# Patient Record
Sex: FEMALE | Race: WHITE | NOT HISPANIC OR LATINO
[De-identification: names, ages, dates, MRNs, and addresses within clinical notes are randomized per-mention and may not be internally consistent; named-entity substitution may affect disease eponyms.]

---

## 2017-01-06 ENCOUNTER — RECORDS - HEALTHEAST (OUTPATIENT)
Dept: ADMINISTRATIVE | Facility: OTHER | Age: 72
End: 2017-01-06

## 2017-01-21 ENCOUNTER — COMMUNICATION - HEALTHEAST (OUTPATIENT)
Dept: FAMILY MEDICINE | Facility: CLINIC | Age: 72
End: 2017-01-21

## 2017-01-21 DIAGNOSIS — E03.9 ACQUIRED HYPOTHYROIDISM: ICD-10-CM

## 2017-01-23 ENCOUNTER — COMMUNICATION - HEALTHEAST (OUTPATIENT)
Dept: FAMILY MEDICINE | Facility: CLINIC | Age: 72
End: 2017-01-23

## 2017-01-24 ENCOUNTER — COMMUNICATION - HEALTHEAST (OUTPATIENT)
Dept: INTERNAL MEDICINE | Facility: CLINIC | Age: 72
End: 2017-01-24

## 2017-01-24 ENCOUNTER — OFFICE VISIT - HEALTHEAST (OUTPATIENT)
Dept: INTERNAL MEDICINE | Facility: CLINIC | Age: 72
End: 2017-01-24

## 2017-01-24 DIAGNOSIS — M25.562 ACUTE PAIN OF LEFT KNEE: ICD-10-CM

## 2017-01-24 DIAGNOSIS — G50.0 TRIGEMINAL NEURALGIA OF LEFT SIDE OF FACE: ICD-10-CM

## 2017-01-24 DIAGNOSIS — M75.31 CALCIFIC TENDINITIS OF RIGHT SHOULDER: ICD-10-CM

## 2017-01-24 DIAGNOSIS — R41.3 MEMORY LOSS: ICD-10-CM

## 2017-01-24 DIAGNOSIS — M25.562 LEFT KNEE PAIN: ICD-10-CM

## 2017-01-24 DIAGNOSIS — E03.4 HYPOTHYROIDISM DUE TO ACQUIRED ATROPHY OF THYROID: ICD-10-CM

## 2017-01-24 DIAGNOSIS — R10.11 RUQ ABDOMINAL PAIN: ICD-10-CM

## 2017-01-24 ASSESSMENT — MIFFLIN-ST. JEOR: SCORE: 1342.97

## 2017-01-31 ENCOUNTER — COMMUNICATION - HEALTHEAST (OUTPATIENT)
Dept: NURSING | Facility: CLINIC | Age: 72
End: 2017-01-31

## 2017-01-31 ENCOUNTER — OFFICE VISIT - HEALTHEAST (OUTPATIENT)
Dept: FAMILY MEDICINE | Facility: CLINIC | Age: 72
End: 2017-01-31

## 2017-01-31 DIAGNOSIS — R10.9 ABDOMINAL PAIN: ICD-10-CM

## 2017-01-31 DIAGNOSIS — R41.3 MEMORY LOSS: ICD-10-CM

## 2017-01-31 DIAGNOSIS — G50.0 TRIGEMINAL NEURALGIA: ICD-10-CM

## 2017-02-01 ENCOUNTER — COMMUNICATION - HEALTHEAST (OUTPATIENT)
Dept: NURSING | Facility: CLINIC | Age: 72
End: 2017-02-01

## 2017-02-20 ENCOUNTER — COMMUNICATION - HEALTHEAST (OUTPATIENT)
Dept: FAMILY MEDICINE | Facility: CLINIC | Age: 72
End: 2017-02-20

## 2017-02-21 ENCOUNTER — HOSPITAL ENCOUNTER (OUTPATIENT)
Dept: CT IMAGING | Facility: CLINIC | Age: 72
Discharge: HOME | End: 2017-02-21
Attending: INTERNAL MEDICINE

## 2017-02-21 DIAGNOSIS — R10.11 RUQ ABDOMINAL PAIN: ICD-10-CM

## 2017-02-28 ENCOUNTER — COMMUNICATION - HEALTHEAST (OUTPATIENT)
Dept: LAB | Facility: CLINIC | Age: 72
End: 2017-02-28

## 2017-03-03 ENCOUNTER — COMMUNICATION - HEALTHEAST (OUTPATIENT)
Dept: FAMILY MEDICINE | Facility: CLINIC | Age: 72
End: 2017-03-03

## 2017-03-03 DIAGNOSIS — E03.9 ACQUIRED HYPOTHYROIDISM: ICD-10-CM

## 2017-03-14 ENCOUNTER — OFFICE VISIT - HEALTHEAST (OUTPATIENT)
Dept: NURSING | Facility: CLINIC | Age: 72
End: 2017-03-14

## 2017-03-14 ENCOUNTER — OFFICE VISIT - HEALTHEAST (OUTPATIENT)
Dept: PHYSICAL THERAPY | Facility: REHABILITATION | Age: 72
End: 2017-03-14

## 2017-03-14 DIAGNOSIS — M62.81 MUSCLE WEAKNESS (GENERALIZED): ICD-10-CM

## 2017-03-14 DIAGNOSIS — M25.662 DECREASED ROM OF LEFT KNEE: ICD-10-CM

## 2017-03-14 DIAGNOSIS — E03.9 ACQUIRED HYPOTHYROIDISM: ICD-10-CM

## 2017-03-14 DIAGNOSIS — G50.0 TRIGEMINAL NEURALGIA: ICD-10-CM

## 2017-03-14 DIAGNOSIS — G89.29 CHRONIC PAIN OF LEFT KNEE: ICD-10-CM

## 2017-03-14 DIAGNOSIS — M25.562 CHRONIC PAIN OF LEFT KNEE: ICD-10-CM

## 2017-03-14 DIAGNOSIS — M25.511 ACUTE PAIN OF RIGHT SHOULDER: ICD-10-CM

## 2017-03-14 DIAGNOSIS — F33.1 MAJOR DEPRESSIVE DISORDER, RECURRENT EPISODE, MODERATE (H): ICD-10-CM

## 2017-03-20 ENCOUNTER — RECORDS - HEALTHEAST (OUTPATIENT)
Dept: ADMINISTRATIVE | Facility: OTHER | Age: 72
End: 2017-03-20

## 2017-04-05 ENCOUNTER — HOSPITAL ENCOUNTER (OUTPATIENT)
Dept: NEUROLOGY | Facility: CLINIC | Age: 72
Setting detail: THERAPIES SERIES
Discharge: STILL ADMITTED OR ADMINISTRATIVE | End: 2017-04-05
Attending: SOCIAL WORKER

## 2017-04-05 ENCOUNTER — HOSPITAL ENCOUNTER (OUTPATIENT)
Dept: NEUROLOGY | Facility: CLINIC | Age: 72
Setting detail: THERAPIES SERIES
Discharge: STILL ADMITTED OR ADMINISTRATIVE | End: 2017-04-05
Attending: INTERNAL MEDICINE

## 2017-04-05 DIAGNOSIS — R41.3 MEMORY LOSS: ICD-10-CM

## 2017-04-12 ENCOUNTER — COMMUNICATION - HEALTHEAST (OUTPATIENT)
Dept: INTERNAL MEDICINE | Facility: CLINIC | Age: 72
End: 2017-04-12

## 2017-04-24 ENCOUNTER — COMMUNICATION - HEALTHEAST (OUTPATIENT)
Dept: INTERNAL MEDICINE | Facility: CLINIC | Age: 72
End: 2017-04-24

## 2017-04-24 ENCOUNTER — RECORDS - HEALTHEAST (OUTPATIENT)
Dept: ADMINISTRATIVE | Facility: OTHER | Age: 72
End: 2017-04-24

## 2017-04-24 ENCOUNTER — OFFICE VISIT - HEALTHEAST (OUTPATIENT)
Dept: INTERNAL MEDICINE | Facility: CLINIC | Age: 72
End: 2017-04-24

## 2017-04-24 DIAGNOSIS — G50.0 TRIGEMINAL NEURALGIA: ICD-10-CM

## 2017-04-24 DIAGNOSIS — R41.3 MEMORY LOSS: ICD-10-CM

## 2017-04-24 DIAGNOSIS — E03.9 ACQUIRED HYPOTHYROIDISM: ICD-10-CM

## 2017-04-24 ASSESSMENT — MIFFLIN-ST. JEOR: SCORE: 1345.69

## 2017-05-03 ENCOUNTER — COMMUNICATION - HEALTHEAST (OUTPATIENT)
Dept: INTERNAL MEDICINE | Facility: CLINIC | Age: 72
End: 2017-05-03

## 2017-05-08 ENCOUNTER — OFFICE VISIT - HEALTHEAST (OUTPATIENT)
Dept: PHYSICAL THERAPY | Facility: REHABILITATION | Age: 72
End: 2017-05-08

## 2017-05-08 ENCOUNTER — OFFICE VISIT - HEALTHEAST (OUTPATIENT)
Dept: NURSING | Facility: CLINIC | Age: 72
End: 2017-05-08

## 2017-05-08 DIAGNOSIS — M25.511 RIGHT SHOULDER PAIN, UNSPECIFIED CHRONICITY: ICD-10-CM

## 2017-05-08 DIAGNOSIS — M25.562 CHRONIC PAIN OF LEFT KNEE: ICD-10-CM

## 2017-05-08 DIAGNOSIS — G89.29 CHRONIC PAIN OF LEFT KNEE: ICD-10-CM

## 2017-05-08 DIAGNOSIS — M25.511 ACUTE PAIN OF RIGHT SHOULDER: ICD-10-CM

## 2017-05-08 DIAGNOSIS — R41.3 MEMORY LOSS: ICD-10-CM

## 2017-05-08 DIAGNOSIS — G50.0 TRIGEMINAL NEURALGIA: ICD-10-CM

## 2017-05-08 DIAGNOSIS — M62.81 MUSCLE WEAKNESS (GENERALIZED): ICD-10-CM

## 2017-05-08 DIAGNOSIS — M25.511 RIGHT SHOULDER PAIN: ICD-10-CM

## 2017-05-08 DIAGNOSIS — E03.9 ACQUIRED HYPOTHYROIDISM: ICD-10-CM

## 2017-05-08 DIAGNOSIS — M25.662 DECREASED ROM OF LEFT KNEE: ICD-10-CM

## 2017-06-02 ENCOUNTER — OFFICE VISIT - HEALTHEAST (OUTPATIENT)
Dept: PHYSICAL THERAPY | Facility: REHABILITATION | Age: 72
End: 2017-06-02

## 2017-06-02 DIAGNOSIS — M25.662 DECREASED ROM OF LEFT KNEE: ICD-10-CM

## 2017-06-02 DIAGNOSIS — M25.511 ACUTE PAIN OF RIGHT SHOULDER: ICD-10-CM

## 2017-06-02 DIAGNOSIS — M62.81 MUSCLE WEAKNESS (GENERALIZED): ICD-10-CM

## 2017-06-02 DIAGNOSIS — G89.29 CHRONIC PAIN OF LEFT KNEE: ICD-10-CM

## 2017-06-02 DIAGNOSIS — M25.562 CHRONIC PAIN OF LEFT KNEE: ICD-10-CM

## 2017-06-05 ENCOUNTER — COMMUNICATION - HEALTHEAST (OUTPATIENT)
Dept: NURSING | Facility: CLINIC | Age: 72
End: 2017-06-05

## 2017-06-06 ENCOUNTER — OFFICE VISIT - HEALTHEAST (OUTPATIENT)
Dept: PHYSICAL THERAPY | Facility: REHABILITATION | Age: 72
End: 2017-06-06

## 2017-06-06 DIAGNOSIS — M25.511 ACUTE PAIN OF RIGHT SHOULDER: ICD-10-CM

## 2017-06-06 DIAGNOSIS — M25.662 DECREASED ROM OF LEFT KNEE: ICD-10-CM

## 2017-06-06 DIAGNOSIS — M25.562 CHRONIC PAIN OF LEFT KNEE: ICD-10-CM

## 2017-06-06 DIAGNOSIS — G89.29 CHRONIC PAIN OF LEFT KNEE: ICD-10-CM

## 2017-06-06 DIAGNOSIS — M62.81 MUSCLE WEAKNESS (GENERALIZED): ICD-10-CM

## 2017-06-09 ENCOUNTER — OFFICE VISIT - HEALTHEAST (OUTPATIENT)
Dept: PHYSICAL THERAPY | Facility: REHABILITATION | Age: 72
End: 2017-06-09

## 2017-06-09 DIAGNOSIS — M25.511 ACUTE PAIN OF RIGHT SHOULDER: ICD-10-CM

## 2017-06-09 DIAGNOSIS — G89.29 CHRONIC PAIN OF LEFT KNEE: ICD-10-CM

## 2017-06-09 DIAGNOSIS — M62.81 MUSCLE WEAKNESS (GENERALIZED): ICD-10-CM

## 2017-06-09 DIAGNOSIS — M25.662 DECREASED ROM OF LEFT KNEE: ICD-10-CM

## 2017-06-09 DIAGNOSIS — M25.562 CHRONIC PAIN OF LEFT KNEE: ICD-10-CM

## 2017-06-13 ENCOUNTER — OFFICE VISIT - HEALTHEAST (OUTPATIENT)
Dept: PHYSICAL THERAPY | Facility: REHABILITATION | Age: 72
End: 2017-06-13

## 2017-06-13 DIAGNOSIS — G89.29 CHRONIC PAIN OF LEFT KNEE: ICD-10-CM

## 2017-06-13 DIAGNOSIS — M25.511 ACUTE PAIN OF RIGHT SHOULDER: ICD-10-CM

## 2017-06-13 DIAGNOSIS — M25.662 DECREASED ROM OF LEFT KNEE: ICD-10-CM

## 2017-06-13 DIAGNOSIS — M62.81 MUSCLE WEAKNESS (GENERALIZED): ICD-10-CM

## 2017-06-13 DIAGNOSIS — M25.562 CHRONIC PAIN OF LEFT KNEE: ICD-10-CM

## 2017-07-01 ENCOUNTER — HEALTH MAINTENANCE LETTER (OUTPATIENT)
Age: 72
End: 2017-07-01

## 2017-07-07 ENCOUNTER — OFFICE VISIT - HEALTHEAST (OUTPATIENT)
Dept: PHYSICAL THERAPY | Facility: REHABILITATION | Age: 72
End: 2017-07-07

## 2017-07-07 DIAGNOSIS — M25.662 DECREASED ROM OF LEFT KNEE: ICD-10-CM

## 2017-07-07 DIAGNOSIS — M25.511 ACUTE PAIN OF RIGHT SHOULDER: ICD-10-CM

## 2017-07-07 DIAGNOSIS — G89.29 CHRONIC PAIN OF LEFT KNEE: ICD-10-CM

## 2017-07-07 DIAGNOSIS — M62.81 MUSCLE WEAKNESS (GENERALIZED): ICD-10-CM

## 2017-07-07 DIAGNOSIS — M25.562 CHRONIC PAIN OF LEFT KNEE: ICD-10-CM

## 2017-07-10 ENCOUNTER — OFFICE VISIT - HEALTHEAST (OUTPATIENT)
Dept: PHYSICAL THERAPY | Facility: REHABILITATION | Age: 72
End: 2017-07-10

## 2017-07-10 DIAGNOSIS — M25.511 ACUTE PAIN OF RIGHT SHOULDER: ICD-10-CM

## 2017-07-10 DIAGNOSIS — M25.662 DECREASED ROM OF LEFT KNEE: ICD-10-CM

## 2017-07-10 DIAGNOSIS — M62.81 MUSCLE WEAKNESS (GENERALIZED): ICD-10-CM

## 2017-07-10 DIAGNOSIS — M25.562 CHRONIC PAIN OF LEFT KNEE: ICD-10-CM

## 2017-07-10 DIAGNOSIS — G89.29 CHRONIC PAIN OF LEFT KNEE: ICD-10-CM

## 2017-07-14 ENCOUNTER — OFFICE VISIT - HEALTHEAST (OUTPATIENT)
Dept: PHYSICAL THERAPY | Facility: REHABILITATION | Age: 72
End: 2017-07-14

## 2017-07-14 ENCOUNTER — COMMUNICATION - HEALTHEAST (OUTPATIENT)
Dept: NURSING | Facility: CLINIC | Age: 72
End: 2017-07-14

## 2017-07-14 DIAGNOSIS — M25.662 DECREASED ROM OF LEFT KNEE: ICD-10-CM

## 2017-07-14 DIAGNOSIS — M25.562 CHRONIC PAIN OF LEFT KNEE: ICD-10-CM

## 2017-07-14 DIAGNOSIS — M25.511 ACUTE PAIN OF RIGHT SHOULDER: ICD-10-CM

## 2017-07-14 DIAGNOSIS — G89.29 CHRONIC PAIN OF LEFT KNEE: ICD-10-CM

## 2017-07-14 DIAGNOSIS — M62.81 MUSCLE WEAKNESS (GENERALIZED): ICD-10-CM

## 2017-07-17 ENCOUNTER — COMMUNICATION - HEALTHEAST (OUTPATIENT)
Dept: NURSING | Facility: CLINIC | Age: 72
End: 2017-07-17

## 2017-07-24 ENCOUNTER — OFFICE VISIT - HEALTHEAST (OUTPATIENT)
Dept: PHYSICAL THERAPY | Facility: REHABILITATION | Age: 72
End: 2017-07-24

## 2017-07-24 DIAGNOSIS — M62.81 MUSCLE WEAKNESS (GENERALIZED): ICD-10-CM

## 2017-07-24 DIAGNOSIS — M25.662 DECREASED ROM OF LEFT KNEE: ICD-10-CM

## 2017-07-24 DIAGNOSIS — M25.562 CHRONIC PAIN OF LEFT KNEE: ICD-10-CM

## 2017-07-24 DIAGNOSIS — M25.511 ACUTE PAIN OF RIGHT SHOULDER: ICD-10-CM

## 2017-07-24 DIAGNOSIS — G89.29 CHRONIC PAIN OF LEFT KNEE: ICD-10-CM

## 2017-07-27 ENCOUNTER — OFFICE VISIT - HEALTHEAST (OUTPATIENT)
Dept: PHYSICAL THERAPY | Facility: REHABILITATION | Age: 72
End: 2017-07-27

## 2017-07-27 DIAGNOSIS — M25.511 ACUTE PAIN OF RIGHT SHOULDER: ICD-10-CM

## 2017-07-27 DIAGNOSIS — M62.81 MUSCLE WEAKNESS (GENERALIZED): ICD-10-CM

## 2017-07-27 DIAGNOSIS — G89.29 CHRONIC PAIN OF LEFT KNEE: ICD-10-CM

## 2017-07-27 DIAGNOSIS — M25.662 DECREASED ROM OF LEFT KNEE: ICD-10-CM

## 2017-07-27 DIAGNOSIS — M25.562 CHRONIC PAIN OF LEFT KNEE: ICD-10-CM

## 2017-08-23 ENCOUNTER — OFFICE VISIT - HEALTHEAST (OUTPATIENT)
Dept: PHYSICAL THERAPY | Facility: REHABILITATION | Age: 72
End: 2017-08-23

## 2017-08-23 DIAGNOSIS — M25.662 DECREASED ROM OF LEFT KNEE: ICD-10-CM

## 2017-08-23 DIAGNOSIS — M62.81 MUSCLE WEAKNESS (GENERALIZED): ICD-10-CM

## 2017-08-23 DIAGNOSIS — M25.511 ACUTE PAIN OF RIGHT SHOULDER: ICD-10-CM

## 2017-08-23 DIAGNOSIS — M25.562 CHRONIC PAIN OF LEFT KNEE: ICD-10-CM

## 2017-08-23 DIAGNOSIS — G89.29 CHRONIC PAIN OF LEFT KNEE: ICD-10-CM

## 2017-08-28 ENCOUNTER — OFFICE VISIT - HEALTHEAST (OUTPATIENT)
Dept: PHYSICAL THERAPY | Facility: REHABILITATION | Age: 72
End: 2017-08-28

## 2017-08-28 DIAGNOSIS — M25.662 DECREASED ROM OF LEFT KNEE: ICD-10-CM

## 2017-08-28 DIAGNOSIS — M62.81 MUSCLE WEAKNESS (GENERALIZED): ICD-10-CM

## 2017-08-28 DIAGNOSIS — M25.562 CHRONIC PAIN OF LEFT KNEE: ICD-10-CM

## 2017-08-28 DIAGNOSIS — G89.29 CHRONIC PAIN OF LEFT KNEE: ICD-10-CM

## 2017-08-28 DIAGNOSIS — M25.511 ACUTE PAIN OF RIGHT SHOULDER: ICD-10-CM

## 2017-08-30 ENCOUNTER — OFFICE VISIT - HEALTHEAST (OUTPATIENT)
Dept: PHYSICAL THERAPY | Facility: REHABILITATION | Age: 72
End: 2017-08-30

## 2017-08-30 DIAGNOSIS — M25.511 ACUTE PAIN OF RIGHT SHOULDER: ICD-10-CM

## 2017-08-30 DIAGNOSIS — M62.81 MUSCLE WEAKNESS (GENERALIZED): ICD-10-CM

## 2017-08-30 DIAGNOSIS — M25.662 DECREASED ROM OF LEFT KNEE: ICD-10-CM

## 2017-08-30 DIAGNOSIS — G89.29 CHRONIC PAIN OF LEFT KNEE: ICD-10-CM

## 2017-08-30 DIAGNOSIS — M25.562 CHRONIC PAIN OF LEFT KNEE: ICD-10-CM

## 2017-09-08 ENCOUNTER — OFFICE VISIT - HEALTHEAST (OUTPATIENT)
Dept: PHYSICAL THERAPY | Facility: REHABILITATION | Age: 72
End: 2017-09-08

## 2017-09-08 DIAGNOSIS — M25.562 CHRONIC PAIN OF LEFT KNEE: ICD-10-CM

## 2017-09-08 DIAGNOSIS — G89.29 CHRONIC PAIN OF LEFT KNEE: ICD-10-CM

## 2017-09-08 DIAGNOSIS — M62.81 MUSCLE WEAKNESS (GENERALIZED): ICD-10-CM

## 2017-09-08 DIAGNOSIS — M25.662 DECREASED ROM OF LEFT KNEE: ICD-10-CM

## 2017-09-08 DIAGNOSIS — M25.511 ACUTE PAIN OF RIGHT SHOULDER: ICD-10-CM

## 2017-09-15 ENCOUNTER — COMMUNICATION - HEALTHEAST (OUTPATIENT)
Dept: INTERNAL MEDICINE | Facility: CLINIC | Age: 72
End: 2017-09-15

## 2017-10-10 ENCOUNTER — HOSPITAL ENCOUNTER (OUTPATIENT)
Dept: NEUROLOGY | Facility: CLINIC | Age: 72
Setting detail: THERAPIES SERIES
Discharge: STILL ADMITTED OR ADMINISTRATIVE | End: 2017-10-10
Attending: PSYCHIATRY & NEUROLOGY

## 2017-10-10 DIAGNOSIS — G47.33 OSA (OBSTRUCTIVE SLEEP APNEA): ICD-10-CM

## 2017-10-10 DIAGNOSIS — F33.1 MAJOR DEPRESSIVE DISORDER, RECURRENT EPISODE, MODERATE (H): ICD-10-CM

## 2017-10-10 DIAGNOSIS — F03.91 MAJOR NEUROCOGNITIVE DISORDER DUE TO ALZHEIMER'S DISEASE, PROBABLE, WITH BEHAVIORAL DISTURBANCE: ICD-10-CM

## 2017-10-10 DIAGNOSIS — I97.3 POSTOPERATIVE HYPERTENSION: ICD-10-CM

## 2017-10-10 DIAGNOSIS — K58.9 IRRITABLE BOWEL DISEASE: ICD-10-CM

## 2017-10-10 DIAGNOSIS — F33.2 SEVERE EPISODE OF RECURRENT MAJOR DEPRESSIVE DISORDER, WITHOUT PSYCHOTIC FEATURES (H): ICD-10-CM

## 2017-10-10 DIAGNOSIS — G50.0 TRIGEMINAL NEURALGIA: ICD-10-CM

## 2017-10-10 DIAGNOSIS — H18.519 ENDOTHELIAL CORNEAL DYSTROPHY: ICD-10-CM

## 2017-10-13 ENCOUNTER — COMMUNICATION - HEALTHEAST (OUTPATIENT)
Dept: NURSING | Facility: CLINIC | Age: 72
End: 2017-10-13

## 2017-10-17 ENCOUNTER — COMMUNICATION - HEALTHEAST (OUTPATIENT)
Dept: NURSING | Facility: CLINIC | Age: 72
End: 2017-10-17

## 2017-10-25 ENCOUNTER — HOSPITAL ENCOUNTER (OUTPATIENT)
Dept: NEUROLOGY | Facility: CLINIC | Age: 72
Setting detail: THERAPIES SERIES
Discharge: STILL ADMITTED OR ADMINISTRATIVE | End: 2017-10-25
Attending: PSYCHIATRY & NEUROLOGY

## 2017-10-25 ENCOUNTER — AMBULATORY - HEALTHEAST (OUTPATIENT)
Dept: NEUROLOGY | Facility: CLINIC | Age: 72
End: 2017-10-25

## 2017-10-25 DIAGNOSIS — G30.9 ALZHEIMER DISEASE (H): ICD-10-CM

## 2017-10-25 DIAGNOSIS — R41.0 CONFUSION: ICD-10-CM

## 2017-10-25 DIAGNOSIS — F02.80 ALZHEIMER DISEASE (H): ICD-10-CM

## 2017-11-03 ENCOUNTER — COMMUNICATION - HEALTHEAST (OUTPATIENT)
Dept: INTERNAL MEDICINE | Facility: CLINIC | Age: 72
End: 2017-11-03

## 2017-11-03 ENCOUNTER — COMMUNICATION - HEALTHEAST (OUTPATIENT)
Dept: NURSING | Facility: CLINIC | Age: 72
End: 2017-11-03

## 2017-11-06 ENCOUNTER — COMMUNICATION - HEALTHEAST (OUTPATIENT)
Dept: NURSING | Facility: CLINIC | Age: 72
End: 2017-11-06

## 2017-11-07 ENCOUNTER — COMMUNICATION - HEALTHEAST (OUTPATIENT)
Dept: CARE COORDINATION | Facility: CLINIC | Age: 72
End: 2017-11-07

## 2017-11-07 ENCOUNTER — COMMUNICATION - HEALTHEAST (OUTPATIENT)
Dept: NURSING | Facility: CLINIC | Age: 72
End: 2017-11-07

## 2017-11-08 ENCOUNTER — AMBULATORY - HEALTHEAST (OUTPATIENT)
Dept: CARE COORDINATION | Facility: CLINIC | Age: 72
End: 2017-11-08

## 2017-11-08 ENCOUNTER — COMMUNICATION - HEALTHEAST (OUTPATIENT)
Dept: INTERNAL MEDICINE | Facility: CLINIC | Age: 72
End: 2017-11-08

## 2017-11-08 ENCOUNTER — OFFICE VISIT - HEALTHEAST (OUTPATIENT)
Dept: INTERNAL MEDICINE | Facility: CLINIC | Age: 72
End: 2017-11-08

## 2017-11-08 DIAGNOSIS — F33.1 MAJOR DEPRESSIVE DISORDER, RECURRENT EPISODE, MODERATE (H): ICD-10-CM

## 2017-11-08 DIAGNOSIS — M15.0 PRIMARY OSTEOARTHRITIS INVOLVING MULTIPLE JOINTS: ICD-10-CM

## 2017-11-08 DIAGNOSIS — F02.80 ALZHEIMER'S DEMENTIA (H): ICD-10-CM

## 2017-11-08 DIAGNOSIS — Z12.11 SCREEN FOR COLON CANCER: ICD-10-CM

## 2017-11-08 DIAGNOSIS — G30.9 ALZHEIMER'S DEMENTIA (H): ICD-10-CM

## 2017-11-08 DIAGNOSIS — M75.31 CALCIFIC TENDINITIS OF RIGHT SHOULDER: ICD-10-CM

## 2017-11-08 DIAGNOSIS — M54.42 CHRONIC MIDLINE LOW BACK PAIN WITH LEFT-SIDED SCIATICA: ICD-10-CM

## 2017-11-08 DIAGNOSIS — Z12.31 VISIT FOR SCREENING MAMMOGRAM: ICD-10-CM

## 2017-11-08 DIAGNOSIS — G50.0 TRIGEMINAL NEURALGIA: ICD-10-CM

## 2017-11-08 DIAGNOSIS — G89.29 CHRONIC MIDLINE LOW BACK PAIN WITH LEFT-SIDED SCIATICA: ICD-10-CM

## 2017-11-13 ENCOUNTER — AMBULATORY - HEALTHEAST (OUTPATIENT)
Dept: CARE COORDINATION | Facility: CLINIC | Age: 72
End: 2017-11-13

## 2017-11-13 DIAGNOSIS — H18.519 ENDOTHELIAL CORNEAL DYSTROPHY: ICD-10-CM

## 2017-11-17 ENCOUNTER — COMMUNICATION - HEALTHEAST (OUTPATIENT)
Dept: NURSING | Facility: CLINIC | Age: 72
End: 2017-11-17

## 2017-11-30 ENCOUNTER — COMMUNICATION - HEALTHEAST (OUTPATIENT)
Dept: NURSING | Facility: CLINIC | Age: 72
End: 2017-11-30

## 2017-12-01 ENCOUNTER — COMMUNICATION - HEALTHEAST (OUTPATIENT)
Dept: CARE COORDINATION | Facility: CLINIC | Age: 72
End: 2017-12-01

## 2017-12-01 ENCOUNTER — AMBULATORY - HEALTHEAST (OUTPATIENT)
Dept: CARE COORDINATION | Facility: CLINIC | Age: 72
End: 2017-12-01

## 2017-12-06 ENCOUNTER — AMBULATORY - HEALTHEAST (OUTPATIENT)
Dept: CARE COORDINATION | Facility: CLINIC | Age: 72
End: 2017-12-06

## 2017-12-15 ENCOUNTER — HOME CARE/HOSPICE - HEALTHEAST (OUTPATIENT)
Dept: HOME HEALTH SERVICES | Facility: HOME HEALTH | Age: 72
End: 2017-12-15

## 2017-12-15 ENCOUNTER — COMMUNICATION - HEALTHEAST (OUTPATIENT)
Dept: HOME HEALTH SERVICES | Facility: HOME HEALTH | Age: 72
End: 2017-12-15

## 2017-12-28 ENCOUNTER — HOSPITAL ENCOUNTER (OUTPATIENT)
Dept: NEUROLOGY | Facility: CLINIC | Age: 72
Setting detail: THERAPIES SERIES
Discharge: STILL ADMITTED OR ADMINISTRATIVE | End: 2017-12-28
Attending: PSYCHIATRY & NEUROLOGY

## 2017-12-28 ENCOUNTER — HOSPITAL ENCOUNTER (OUTPATIENT)
Dept: OCCUPATIONAL THERAPY | Age: 72
Setting detail: THERAPIES SERIES
Discharge: STILL ADMITTED OR ADMINISTRATIVE | End: 2017-12-28
Attending: PSYCHIATRY & NEUROLOGY

## 2017-12-28 DIAGNOSIS — R41.89 COGNITIVE IMPAIRMENT: ICD-10-CM

## 2017-12-28 DIAGNOSIS — R41.3 OTHER AMNESIA: ICD-10-CM

## 2018-01-02 ENCOUNTER — COMMUNICATION - HEALTHEAST (OUTPATIENT)
Dept: NURSING | Facility: CLINIC | Age: 73
End: 2018-01-02

## 2018-01-05 ENCOUNTER — COMMUNICATION - HEALTHEAST (OUTPATIENT)
Dept: CARE COORDINATION | Facility: CLINIC | Age: 73
End: 2018-01-05

## 2018-01-05 ENCOUNTER — COMMUNICATION - HEALTHEAST (OUTPATIENT)
Dept: NURSING | Facility: CLINIC | Age: 73
End: 2018-01-05

## 2018-01-08 ENCOUNTER — COMMUNICATION - HEALTHEAST (OUTPATIENT)
Dept: CARE COORDINATION | Facility: CLINIC | Age: 73
End: 2018-01-08

## 2018-01-08 ENCOUNTER — AMBULATORY - HEALTHEAST (OUTPATIENT)
Dept: NEUROLOGY | Facility: CLINIC | Age: 73
End: 2018-01-08

## 2018-01-08 ENCOUNTER — COMMUNICATION - HEALTHEAST (OUTPATIENT)
Dept: NURSING | Facility: CLINIC | Age: 73
End: 2018-01-08

## 2018-01-15 ENCOUNTER — COMMUNICATION - HEALTHEAST (OUTPATIENT)
Dept: NURSING | Facility: CLINIC | Age: 73
End: 2018-01-15

## 2018-01-22 ENCOUNTER — COMMUNICATION - HEALTHEAST (OUTPATIENT)
Dept: NURSING | Facility: CLINIC | Age: 73
End: 2018-01-22

## 2018-01-27 ENCOUNTER — COMMUNICATION - HEALTHEAST (OUTPATIENT)
Dept: FAMILY MEDICINE | Facility: CLINIC | Age: 73
End: 2018-01-27

## 2018-01-27 DIAGNOSIS — G50.0 TRIGEMINAL NEURALGIA: ICD-10-CM

## 2018-01-30 ENCOUNTER — COMMUNICATION - HEALTHEAST (OUTPATIENT)
Dept: NURSING | Facility: CLINIC | Age: 73
End: 2018-01-30

## 2018-02-12 ENCOUNTER — AMBULATORY - HEALTHEAST (OUTPATIENT)
Dept: CARE COORDINATION | Facility: CLINIC | Age: 73
End: 2018-02-12

## 2018-02-12 ENCOUNTER — AMBULATORY - HEALTHEAST (OUTPATIENT)
Dept: NEUROLOGY | Facility: CLINIC | Age: 73
End: 2018-02-12

## 2018-02-13 ENCOUNTER — COMMUNICATION - HEALTHEAST (OUTPATIENT)
Dept: NURSING | Facility: CLINIC | Age: 73
End: 2018-02-13

## 2018-02-14 ENCOUNTER — COMMUNICATION - HEALTHEAST (OUTPATIENT)
Dept: CARE COORDINATION | Facility: CLINIC | Age: 73
End: 2018-02-14

## 2018-02-21 ENCOUNTER — AMBULATORY - HEALTHEAST (OUTPATIENT)
Dept: CARE COORDINATION | Facility: CLINIC | Age: 73
End: 2018-02-21

## 2018-02-21 DIAGNOSIS — Z71.89 COUNSELING AND COORDINATION OF CARE: ICD-10-CM

## 2018-02-27 ENCOUNTER — COMMUNICATION - HEALTHEAST (OUTPATIENT)
Dept: NURSING | Facility: CLINIC | Age: 73
End: 2018-02-27

## 2018-03-01 ENCOUNTER — AMBULATORY - HEALTHEAST (OUTPATIENT)
Dept: CARE COORDINATION | Facility: CLINIC | Age: 73
End: 2018-03-01

## 2018-03-06 ENCOUNTER — COMMUNICATION - HEALTHEAST (OUTPATIENT)
Dept: NURSING | Facility: CLINIC | Age: 73
End: 2018-03-06

## 2018-03-06 ENCOUNTER — AMBULATORY - HEALTHEAST (OUTPATIENT)
Dept: NEUROLOGY | Facility: CLINIC | Age: 73
End: 2018-03-06

## 2018-03-08 ENCOUNTER — COMMUNICATION - HEALTHEAST (OUTPATIENT)
Dept: NURSING | Facility: CLINIC | Age: 73
End: 2018-03-08

## 2018-03-08 ENCOUNTER — COMMUNICATION - HEALTHEAST (OUTPATIENT)
Dept: INTERNAL MEDICINE | Facility: CLINIC | Age: 73
End: 2018-03-08

## 2018-03-21 ENCOUNTER — COMMUNICATION - HEALTHEAST (OUTPATIENT)
Dept: NURSING | Facility: CLINIC | Age: 73
End: 2018-03-21

## 2018-03-23 ENCOUNTER — COMMUNICATION - HEALTHEAST (OUTPATIENT)
Dept: NURSING | Facility: CLINIC | Age: 73
End: 2018-03-23

## 2018-03-23 ENCOUNTER — OFFICE VISIT - HEALTHEAST (OUTPATIENT)
Dept: INTERNAL MEDICINE | Facility: CLINIC | Age: 73
End: 2018-03-23

## 2018-03-23 DIAGNOSIS — Z78.0 POSTMENOPAUSAL: ICD-10-CM

## 2018-03-23 DIAGNOSIS — M25.562 CHRONIC PAIN OF LEFT KNEE: ICD-10-CM

## 2018-03-23 DIAGNOSIS — C73 THYROID CANCER (H): ICD-10-CM

## 2018-03-23 DIAGNOSIS — E03.9 HYPOTHYROIDISM, UNSPECIFIED TYPE: ICD-10-CM

## 2018-03-23 DIAGNOSIS — Z12.31 VISIT FOR SCREENING MAMMOGRAM: ICD-10-CM

## 2018-03-23 DIAGNOSIS — G30.0 EARLY ONSET ALZHEIMER'S DEMENTIA WITHOUT BEHAVIORAL DISTURBANCE (H): ICD-10-CM

## 2018-03-23 DIAGNOSIS — G89.29 CHRONIC PAIN OF LEFT KNEE: ICD-10-CM

## 2018-03-23 DIAGNOSIS — F02.80 EARLY ONSET ALZHEIMER'S DEMENTIA WITHOUT BEHAVIORAL DISTURBANCE (H): ICD-10-CM

## 2018-03-23 DIAGNOSIS — M25.511 CHRONIC RIGHT SHOULDER PAIN: ICD-10-CM

## 2018-03-23 DIAGNOSIS — G89.29 CHRONIC RIGHT SHOULDER PAIN: ICD-10-CM

## 2018-03-23 DIAGNOSIS — E55.9 VITAMIN D DEFICIENCY: ICD-10-CM

## 2018-03-23 LAB — TSH SERPL DL<=0.005 MIU/L-ACNC: 3.36 UIU/ML (ref 0.3–5)

## 2018-03-23 ASSESSMENT — MIFFLIN-ST. JEOR: SCORE: 1290.81

## 2018-03-26 LAB
25(OH)D3 SERPL-MCNC: 44 NG/ML (ref 30–80)
25(OH)D3 SERPL-MCNC: 44 NG/ML (ref 30–80)
THYROGLOBULIN ANTIBODY, S: <1.8 IU/ML
THYROGLOBULIN INTERPRETATION - HISTORICAL: ABNORMAL
THYROGLOBULIN, TUMOR MARKER: 0.2 NG/ML

## 2018-03-27 ENCOUNTER — COMMUNICATION - HEALTHEAST (OUTPATIENT)
Dept: INTERNAL MEDICINE | Facility: CLINIC | Age: 73
End: 2018-03-27

## 2018-03-27 DIAGNOSIS — C73 THYROID CANCER (H): ICD-10-CM

## 2018-03-27 DIAGNOSIS — R76.8 THYROGLOBULIN ANTIBODY POSITIVE: ICD-10-CM

## 2018-03-27 DIAGNOSIS — E89.0 H/O THYROIDECTOMY: ICD-10-CM

## 2018-03-28 ENCOUNTER — HOSPITAL ENCOUNTER (OUTPATIENT)
Dept: NEUROLOGY | Facility: CLINIC | Age: 73
Setting detail: THERAPIES SERIES
Discharge: STILL ADMITTED OR ADMINISTRATIVE | End: 2018-03-28
Attending: PSYCHIATRY & NEUROLOGY

## 2018-03-28 DIAGNOSIS — F02.80 ALZHEIMER'S DEMENTIA WITHOUT BEHAVIORAL DISTURBANCE, UNSPECIFIED TIMING OF DEMENTIA ONSET: ICD-10-CM

## 2018-03-28 DIAGNOSIS — G30.9 ALZHEIMER'S DEMENTIA WITHOUT BEHAVIORAL DISTURBANCE, UNSPECIFIED TIMING OF DEMENTIA ONSET: ICD-10-CM

## 2018-04-05 ENCOUNTER — AMBULATORY - HEALTHEAST (OUTPATIENT)
Dept: NEUROLOGY | Facility: CLINIC | Age: 73
End: 2018-04-05

## 2018-04-10 ENCOUNTER — HOSPITAL ENCOUNTER (OUTPATIENT)
Dept: ULTRASOUND IMAGING | Facility: CLINIC | Age: 73
Discharge: HOME | End: 2018-04-10
Attending: INTERNAL MEDICINE

## 2018-04-10 DIAGNOSIS — E89.0 H/O THYROIDECTOMY: ICD-10-CM

## 2018-04-10 DIAGNOSIS — R76.8 THYROGLOBULIN ANTIBODY POSITIVE: ICD-10-CM

## 2018-04-10 DIAGNOSIS — C73 THYROID CANCER (H): ICD-10-CM

## 2018-04-10 DIAGNOSIS — R94.6 ABNORMAL RESULTS OF THYROID FUNCTION STUDIES: ICD-10-CM

## 2018-04-10 DIAGNOSIS — E89.0 POSTPROCEDURAL HYPOTHYROIDISM: ICD-10-CM

## 2018-04-12 ENCOUNTER — RECORDS - HEALTHEAST (OUTPATIENT)
Dept: ADMINISTRATIVE | Facility: OTHER | Age: 73
End: 2018-04-12

## 2018-04-13 ENCOUNTER — COMMUNICATION - HEALTHEAST (OUTPATIENT)
Dept: NURSING | Facility: CLINIC | Age: 73
End: 2018-04-13

## 2018-04-17 ENCOUNTER — AMBULATORY - HEALTHEAST (OUTPATIENT)
Dept: NURSING | Facility: CLINIC | Age: 73
End: 2018-04-17

## 2018-05-04 ENCOUNTER — COMMUNICATION - HEALTHEAST (OUTPATIENT)
Dept: NURSING | Facility: CLINIC | Age: 73
End: 2018-05-04

## 2018-05-07 ENCOUNTER — COMMUNICATION - HEALTHEAST (OUTPATIENT)
Dept: CARE COORDINATION | Facility: CLINIC | Age: 73
End: 2018-05-07

## 2018-05-09 ENCOUNTER — AMBULATORY - HEALTHEAST (OUTPATIENT)
Dept: NURSING | Facility: CLINIC | Age: 73
End: 2018-05-09

## 2018-05-09 ENCOUNTER — RECORDS - HEALTHEAST (OUTPATIENT)
Dept: ADMINISTRATIVE | Facility: OTHER | Age: 73
End: 2018-05-09

## 2018-05-09 ENCOUNTER — AMBULATORY - HEALTHEAST (OUTPATIENT)
Dept: NEUROLOGY | Facility: CLINIC | Age: 73
End: 2018-05-09

## 2018-05-10 ENCOUNTER — AMBULATORY - HEALTHEAST (OUTPATIENT)
Dept: NEUROLOGY | Facility: CLINIC | Age: 73
End: 2018-05-10

## 2018-05-10 ENCOUNTER — AMBULATORY - HEALTHEAST (OUTPATIENT)
Dept: CARE COORDINATION | Facility: CLINIC | Age: 73
End: 2018-05-10

## 2018-05-10 DIAGNOSIS — F02.80 ALZHEIMER'S DISEASE (H): ICD-10-CM

## 2018-05-10 DIAGNOSIS — F02.80 DEMENTIA IN ALZHEIMER'S DISEASE (H): ICD-10-CM

## 2018-05-10 DIAGNOSIS — G30.9 DEMENTIA IN ALZHEIMER'S DISEASE (H): ICD-10-CM

## 2018-05-10 DIAGNOSIS — G30.9 ALZHEIMER'S DISEASE (H): ICD-10-CM

## 2018-06-01 ENCOUNTER — COMMUNICATION - HEALTHEAST (OUTPATIENT)
Dept: NURSING | Facility: CLINIC | Age: 73
End: 2018-06-01

## 2018-06-01 ENCOUNTER — COMMUNICATION - HEALTHEAST (OUTPATIENT)
Dept: INTERNAL MEDICINE | Facility: CLINIC | Age: 73
End: 2018-06-01

## 2018-06-11 ENCOUNTER — HOSPITAL ENCOUNTER (OUTPATIENT)
Dept: OCCUPATIONAL THERAPY | Age: 73
Setting detail: THERAPIES SERIES
Discharge: STILL ADMITTED OR ADMINISTRATIVE | End: 2018-06-11
Attending: PSYCHIATRY & NEUROLOGY

## 2018-06-11 ENCOUNTER — HOSPITAL ENCOUNTER (OUTPATIENT)
Dept: NEUROLOGY | Facility: CLINIC | Age: 73
Setting detail: THERAPIES SERIES
Discharge: STILL ADMITTED OR ADMINISTRATIVE | End: 2018-06-11
Attending: PSYCHIATRY & NEUROLOGY

## 2018-06-11 DIAGNOSIS — G30.9 DEMENTIA IN ALZHEIMER'S DISEASE (H): ICD-10-CM

## 2018-06-11 DIAGNOSIS — R41.89 COGNITIVE IMPAIRMENT: ICD-10-CM

## 2018-06-11 DIAGNOSIS — F02.80 ALZHEIMER'S DISEASE (H): ICD-10-CM

## 2018-06-11 DIAGNOSIS — F02.80 DEMENTIA IN ALZHEIMER'S DISEASE (H): ICD-10-CM

## 2018-06-11 DIAGNOSIS — G30.9 ALZHEIMER'S DISEASE (H): ICD-10-CM

## 2018-06-12 ENCOUNTER — AMBULATORY - HEALTHEAST (OUTPATIENT)
Dept: NEUROLOGY | Facility: CLINIC | Age: 73
End: 2018-06-12

## 2018-06-13 ENCOUNTER — AMBULATORY - HEALTHEAST (OUTPATIENT)
Dept: NEUROLOGY | Facility: CLINIC | Age: 73
End: 2018-06-13

## 2018-06-15 ENCOUNTER — COMMUNICATION - HEALTHEAST (OUTPATIENT)
Dept: NURSING | Facility: CLINIC | Age: 73
End: 2018-06-15

## 2018-06-20 ENCOUNTER — COMMUNICATION - HEALTHEAST (OUTPATIENT)
Dept: NEUROLOGY | Facility: CLINIC | Age: 73
End: 2018-06-20

## 2018-06-28 ENCOUNTER — AMBULATORY - HEALTHEAST (OUTPATIENT)
Dept: NURSING | Facility: CLINIC | Age: 73
End: 2018-06-28

## 2018-06-28 ENCOUNTER — AMBULATORY - HEALTHEAST (OUTPATIENT)
Dept: NEUROLOGY | Facility: CLINIC | Age: 73
End: 2018-06-28

## 2018-06-28 ENCOUNTER — COMMUNICATION - HEALTHEAST (OUTPATIENT)
Dept: NURSING | Facility: CLINIC | Age: 73
End: 2018-06-28

## 2018-07-07 ENCOUNTER — HEALTH MAINTENANCE LETTER (OUTPATIENT)
Age: 73
End: 2018-07-07

## 2018-07-12 ENCOUNTER — COMMUNICATION - HEALTHEAST (OUTPATIENT)
Dept: NURSING | Facility: CLINIC | Age: 73
End: 2018-07-12

## 2018-07-16 ENCOUNTER — COMMUNICATION - HEALTHEAST (OUTPATIENT)
Dept: INTERNAL MEDICINE | Facility: CLINIC | Age: 73
End: 2018-07-16

## 2018-07-16 DIAGNOSIS — Z12.11 COLON CANCER SCREENING: ICD-10-CM

## 2018-07-19 ENCOUNTER — COMMUNICATION - HEALTHEAST (OUTPATIENT)
Dept: NURSING | Facility: CLINIC | Age: 73
End: 2018-07-19

## 2018-08-05 ENCOUNTER — COMMUNICATION - HEALTHEAST (OUTPATIENT)
Dept: INTERNAL MEDICINE | Facility: CLINIC | Age: 73
End: 2018-08-05

## 2018-08-05 DIAGNOSIS — E03.9 ACQUIRED HYPOTHYROIDISM: ICD-10-CM

## 2018-08-10 ENCOUNTER — COMMUNICATION - HEALTHEAST (OUTPATIENT)
Dept: INTERNAL MEDICINE | Facility: CLINIC | Age: 73
End: 2018-08-10

## 2018-08-28 ENCOUNTER — AMBULATORY - HEALTHEAST (OUTPATIENT)
Dept: NEUROLOGY | Facility: CLINIC | Age: 73
End: 2018-08-28

## 2018-11-14 ENCOUNTER — COMMUNICATION - HEALTHEAST (OUTPATIENT)
Dept: INTERNAL MEDICINE | Facility: CLINIC | Age: 73
End: 2018-11-14

## 2018-11-15 ENCOUNTER — OFFICE VISIT - HEALTHEAST (OUTPATIENT)
Dept: INTERNAL MEDICINE | Facility: CLINIC | Age: 73
End: 2018-11-15

## 2018-11-15 DIAGNOSIS — C73 THYROID CANCER (H): ICD-10-CM

## 2018-11-15 DIAGNOSIS — E03.9 HYPOTHYROIDISM, UNSPECIFIED TYPE: ICD-10-CM

## 2018-11-15 DIAGNOSIS — F02.80 ALZHEIMER'S DEMENTIA WITHOUT BEHAVIORAL DISTURBANCE, UNSPECIFIED TIMING OF DEMENTIA ONSET: ICD-10-CM

## 2018-11-15 DIAGNOSIS — E55.9 VITAMIN D DEFICIENCY: ICD-10-CM

## 2018-11-15 DIAGNOSIS — F32.1 CURRENT MODERATE EPISODE OF MAJOR DEPRESSIVE DISORDER, UNSPECIFIED WHETHER RECURRENT (H): ICD-10-CM

## 2018-11-15 DIAGNOSIS — G30.9 ALZHEIMER'S DEMENTIA WITHOUT BEHAVIORAL DISTURBANCE, UNSPECIFIED TIMING OF DEMENTIA ONSET: ICD-10-CM

## 2018-11-15 LAB
ERYTHROCYTE [DISTWIDTH] IN BLOOD BY AUTOMATED COUNT: 11.1 % (ref 11–14.5)
HCT VFR BLD AUTO: 39.7 % (ref 35–47)
HGB BLD-MCNC: 13.5 G/DL (ref 12–16)
MCH RBC QN AUTO: 32.6 PG (ref 27–34)
MCHC RBC AUTO-ENTMCNC: 34.1 G/DL (ref 32–36)
MCV RBC AUTO: 96 FL (ref 80–100)
PLATELET # BLD AUTO: 257 THOU/UL (ref 140–440)
PMV BLD AUTO: 7 FL (ref 7–10)
RBC # BLD AUTO: 4.15 MILL/UL (ref 3.8–5.4)
WBC: 6.7 THOU/UL (ref 4–11)

## 2018-11-15 ASSESSMENT — MIFFLIN-ST. JEOR: SCORE: 1263.59

## 2018-11-16 LAB
ALBUMIN SERPL-MCNC: 4.1 G/DL (ref 3.5–5)
ALP SERPL-CCNC: 82 U/L (ref 45–120)
ALT SERPL W P-5'-P-CCNC: <9 U/L (ref 0–45)
ANION GAP SERPL CALCULATED.3IONS-SCNC: 11 MMOL/L (ref 5–18)
AST SERPL W P-5'-P-CCNC: 16 U/L (ref 0–40)
BILIRUB SERPL-MCNC: 0.3 MG/DL (ref 0–1)
BUN SERPL-MCNC: 19 MG/DL (ref 8–28)
CALCIUM SERPL-MCNC: 10.2 MG/DL (ref 8.5–10.5)
CHLORIDE BLD-SCNC: 105 MMOL/L (ref 98–107)
CO2 SERPL-SCNC: 26 MMOL/L (ref 22–31)
CREAT SERPL-MCNC: 0.91 MG/DL (ref 0.6–1.1)
GFR SERPL CREATININE-BSD FRML MDRD: >60 ML/MIN/1.73M2
GLUCOSE BLD-MCNC: 87 MG/DL (ref 70–125)
POTASSIUM BLD-SCNC: 4.9 MMOL/L (ref 3.5–5)
PROT SERPL-MCNC: 6.9 G/DL (ref 6–8)
SODIUM SERPL-SCNC: 142 MMOL/L (ref 136–145)
TSH SERPL DL<=0.005 MIU/L-ACNC: 10.8 UIU/ML (ref 0.3–5)

## 2018-11-18 LAB
THYROGLOB AB SERPL-ACNC: <0.9 IU/ML (ref 0–4)
THYROGLOB SERPL-MCNC: ABNORMAL NG/ML (ref 1.3–31.8)
THYROGLOBULIN, SERUM OR: <0.1 NG/ML (ref 1.3–31.8)

## 2018-11-19 LAB — 25(OH)D3 SERPL-MCNC: 54.6 NG/ML (ref 30–80)

## 2018-11-25 ENCOUNTER — COMMUNICATION - HEALTHEAST (OUTPATIENT)
Dept: INTERNAL MEDICINE | Facility: CLINIC | Age: 73
End: 2018-11-25

## 2018-11-25 DIAGNOSIS — E03.9 HYPOTHYROIDISM, UNSPECIFIED TYPE: ICD-10-CM

## 2018-11-25 DIAGNOSIS — F32.A DEPRESSION, UNSPECIFIED DEPRESSION TYPE: ICD-10-CM

## 2018-11-25 DIAGNOSIS — F02.80 ALZHEIMER'S DEMENTIA WITHOUT BEHAVIORAL DISTURBANCE, UNSPECIFIED TIMING OF DEMENTIA ONSET: ICD-10-CM

## 2018-11-25 DIAGNOSIS — G30.9 ALZHEIMER'S DEMENTIA WITHOUT BEHAVIORAL DISTURBANCE, UNSPECIFIED TIMING OF DEMENTIA ONSET: ICD-10-CM

## 2018-11-28 ENCOUNTER — HOME CARE/HOSPICE - HEALTHEAST (OUTPATIENT)
Dept: HOME HEALTH SERVICES | Facility: HOME HEALTH | Age: 73
End: 2018-11-28

## 2018-11-29 ENCOUNTER — COMMUNICATION - HEALTHEAST (OUTPATIENT)
Dept: HOME HEALTH SERVICES | Facility: HOME HEALTH | Age: 73
End: 2018-11-29

## 2018-12-03 ENCOUNTER — COMMUNICATION - HEALTHEAST (OUTPATIENT)
Dept: CARE COORDINATION | Facility: CLINIC | Age: 73
End: 2018-12-03

## 2018-12-03 ENCOUNTER — AMBULATORY - HEALTHEAST (OUTPATIENT)
Dept: NURSING | Facility: CLINIC | Age: 73
End: 2018-12-03

## 2018-12-03 DIAGNOSIS — G30.9 ALZHEIMER'S DEMENTIA WITH BEHAVIORAL DISTURBANCE, UNSPECIFIED TIMING OF DEMENTIA ONSET: ICD-10-CM

## 2018-12-03 DIAGNOSIS — F32.89 OTHER DEPRESSION: ICD-10-CM

## 2018-12-03 DIAGNOSIS — F02.81 ALZHEIMER'S DEMENTIA WITH BEHAVIORAL DISTURBANCE, UNSPECIFIED TIMING OF DEMENTIA ONSET: ICD-10-CM

## 2018-12-03 DIAGNOSIS — E03.9 HYPOTHYROIDISM, UNSPECIFIED TYPE: ICD-10-CM

## 2018-12-05 ENCOUNTER — COMMUNICATION - HEALTHEAST (OUTPATIENT)
Dept: INTERNAL MEDICINE | Facility: CLINIC | Age: 73
End: 2018-12-05

## 2018-12-07 ENCOUNTER — COMMUNICATION - HEALTHEAST (OUTPATIENT)
Dept: INTERNAL MEDICINE | Facility: CLINIC | Age: 73
End: 2018-12-07

## 2018-12-10 ENCOUNTER — HOME CARE/HOSPICE - HEALTHEAST (OUTPATIENT)
Dept: HOME HEALTH SERVICES | Facility: HOME HEALTH | Age: 73
End: 2018-12-10

## 2018-12-10 ENCOUNTER — COMMUNICATION - HEALTHEAST (OUTPATIENT)
Dept: INTERNAL MEDICINE | Facility: CLINIC | Age: 73
End: 2018-12-10

## 2018-12-10 DIAGNOSIS — F02.80 ALZHEIMER'S DEMENTIA WITHOUT BEHAVIORAL DISTURBANCE, UNSPECIFIED TIMING OF DEMENTIA ONSET: ICD-10-CM

## 2018-12-10 DIAGNOSIS — G30.9 ALZHEIMER'S DEMENTIA WITHOUT BEHAVIORAL DISTURBANCE, UNSPECIFIED TIMING OF DEMENTIA ONSET: ICD-10-CM

## 2018-12-11 ENCOUNTER — COMMUNICATION - HEALTHEAST (OUTPATIENT)
Dept: HOME HEALTH SERVICES | Facility: HOME HEALTH | Age: 73
End: 2018-12-11

## 2018-12-12 ENCOUNTER — HOME CARE/HOSPICE - HEALTHEAST (OUTPATIENT)
Dept: HOME HEALTH SERVICES | Facility: HOME HEALTH | Age: 73
End: 2018-12-12

## 2018-12-14 ENCOUNTER — COMMUNICATION - HEALTHEAST (OUTPATIENT)
Dept: INTERNAL MEDICINE | Facility: CLINIC | Age: 73
End: 2018-12-14

## 2019-01-28 ENCOUNTER — COMMUNICATION - HEALTHEAST (OUTPATIENT)
Dept: INTERNAL MEDICINE | Facility: CLINIC | Age: 74
End: 2019-01-28

## 2019-02-05 ENCOUNTER — OFFICE VISIT - HEALTHEAST (OUTPATIENT)
Dept: INTERNAL MEDICINE | Facility: CLINIC | Age: 74
End: 2019-02-05

## 2019-02-05 ENCOUNTER — COMMUNICATION - HEALTHEAST (OUTPATIENT)
Dept: INTERNAL MEDICINE | Facility: CLINIC | Age: 74
End: 2019-02-05

## 2019-02-05 DIAGNOSIS — I10 ESSENTIAL HYPERTENSION: ICD-10-CM

## 2019-02-05 DIAGNOSIS — K04.7 DENTAL INFECTION: ICD-10-CM

## 2019-02-05 DIAGNOSIS — G30.9 ALZHEIMER'S DEMENTIA WITHOUT BEHAVIORAL DISTURBANCE, UNSPECIFIED TIMING OF DEMENTIA ONSET: ICD-10-CM

## 2019-02-05 DIAGNOSIS — F02.80 ALZHEIMER'S DEMENTIA WITHOUT BEHAVIORAL DISTURBANCE, UNSPECIFIED TIMING OF DEMENTIA ONSET: ICD-10-CM

## 2019-02-05 DIAGNOSIS — F17.200 SMOKING: ICD-10-CM

## 2019-02-05 DIAGNOSIS — Z23 FLU VACCINE NEED: ICD-10-CM

## 2019-02-05 DIAGNOSIS — E03.9 HYPOTHYROIDISM, UNSPECIFIED TYPE: ICD-10-CM

## 2019-02-05 DIAGNOSIS — Z11.1 SCREENING-PULMONARY TB: ICD-10-CM

## 2019-02-05 DIAGNOSIS — C73 THYROID CANCER (H): ICD-10-CM

## 2019-02-05 DIAGNOSIS — F33.1 MAJOR DEPRESSIVE DISORDER, RECURRENT EPISODE, MODERATE (H): ICD-10-CM

## 2019-02-06 ENCOUNTER — COMMUNICATION - HEALTHEAST (OUTPATIENT)
Dept: INTERNAL MEDICINE | Facility: CLINIC | Age: 74
End: 2019-02-06

## 2019-02-06 LAB — TSH SERPL DL<=0.005 MIU/L-ACNC: 8.57 UIU/ML (ref 0.3–5)

## 2019-02-07 LAB
GAMMA INTERFERON BACKGROUND BLD IA-ACNC: 0.12 IU/ML
M TB IFN-G BLD-IMP: NEGATIVE
MITOGEN IGNF BCKGRD COR BLD-ACNC: -0.01 IU/ML
MITOGEN IGNF BCKGRD COR BLD-ACNC: -0.06 IU/ML
QTF INTERPRETATION: NORMAL
QTF MITOGEN - NIL: >10 IU/ML

## 2019-02-08 ENCOUNTER — COMMUNICATION - HEALTHEAST (OUTPATIENT)
Dept: INTERNAL MEDICINE | Facility: CLINIC | Age: 74
End: 2019-02-08

## 2019-02-08 DIAGNOSIS — E03.9 HYPOTHYROIDISM, UNSPECIFIED TYPE: ICD-10-CM

## 2019-02-21 ENCOUNTER — COMMUNICATION - HEALTHEAST (OUTPATIENT)
Dept: INTERNAL MEDICINE | Facility: CLINIC | Age: 74
End: 2019-02-21

## 2019-02-25 ENCOUNTER — COMMUNICATION - HEALTHEAST (OUTPATIENT)
Dept: INTERNAL MEDICINE | Facility: CLINIC | Age: 74
End: 2019-02-25

## 2019-04-03 ENCOUNTER — COMMUNICATION - HEALTHEAST (OUTPATIENT)
Dept: INTERNAL MEDICINE | Facility: CLINIC | Age: 74
End: 2019-04-03

## 2021-05-30 VITALS — BODY MASS INDEX: 37.9 KG/M2 | HEIGHT: 61 IN | WEIGHT: 202.25 LBS | BODY MASS INDEX: 37.9 KG/M2

## 2021-05-30 VITALS — HEIGHT: 62 IN | BODY MASS INDEX: 36.73 KG/M2 | WEIGHT: 199.6 LBS

## 2021-05-30 VITALS — WEIGHT: 201 LBS | BODY MASS INDEX: 37.36 KG/M2

## 2021-05-30 VITALS — BODY MASS INDEX: 37.36 KG/M2 | WEIGHT: 201 LBS

## 2021-05-30 VITALS — BODY MASS INDEX: 36.62 KG/M2 | WEIGHT: 199 LBS | HEIGHT: 62 IN

## 2021-05-31 VITALS — BODY MASS INDEX: 35.3 KG/M2 | WEIGHT: 193 LBS

## 2021-05-31 VITALS — BODY MASS INDEX: 36.99 KG/M2 | WEIGHT: 199 LBS

## 2021-05-31 VITALS — WEIGHT: 192.2 LBS | BODY MASS INDEX: 35.73 KG/M2

## 2021-06-01 VITALS — BODY MASS INDEX: 36.06 KG/M2 | WEIGHT: 191 LBS | HEIGHT: 61 IN

## 2021-06-01 VITALS — WEIGHT: 185 LBS | BODY MASS INDEX: 35.54 KG/M2

## 2021-06-01 VITALS — BODY MASS INDEX: 36.88 KG/M2 | WEIGHT: 192 LBS

## 2021-06-02 VITALS — BODY MASS INDEX: 34.93 KG/M2 | WEIGHT: 185 LBS | HEIGHT: 61 IN

## 2021-06-02 VITALS — WEIGHT: 193.2 LBS | BODY MASS INDEX: 37.11 KG/M2

## 2021-06-08 NOTE — PROGRESS NOTES
01/31/17 Care Guide met with patient in clinic today per Dr. Verdin. Patient has Dr. Verdin listed as PCP, but she also saw Dr. Ruiz at [facility] on 01/24/17 to establish care. Dr. Ruiz ordered a CT Abdomen Pelvis with IV Contrast on 01/24/17, Dr. Verdin would like for patient to get the CT scheduled.     Care Guide explained to patient she would have to choose between  or Dr. Verdin to be her primary care physician because both are internal medicine/family medicine doctors. Care Guide explained to patient what a primary care physician is and the importance of having one primary care doctor. Per patient, she is not ready to choose which ones she wants as her primary doctor yet. States she would like to see Dr. Verdin one last time to make her decision.    Care Guide will send staff message to [facility] and [facility] schedulers regarding the CT order ordered by Dr. Ruiz on 01/24/17.

## 2021-06-08 NOTE — PROGRESS NOTES
"Assessment and Plan  Pat is seeing both me and Dr. Ruiz of [facility] or primary care.  She really does need to stick with one clinic to insure best care coordination.  I am happy to continue seeing her, and also think it's fine if she continues with Dr. Ruiz.  She does not have to decide today, but soon.    1. Trigeminal neuralgia  Pat's wish is not change in medication - she feels gabapentin is \"fine\" (though it doesn't control her symptoms adequately).  Reminded her to fill prescription for Augmentin from Dr. Ruiz  To address sinusitis.  She wants to find a new endodontist, as she feels dental issues are the root of her pain    2. Memory loss  I encouraged her to follow-up on the referral Dr. Ruiz gave her.  I will try to contact her psychiatrist and have permission to speak to him.  Labs and MRI normal.  MMSE results reassuring, but she is notably forgetful at times and disorganized.  I would like to know to what extent - if any  - underlying mental health issues are contributing to her memory issues, and how much of this is poor attention and disorganization vs. Inability to remember..     3. Abdominal pain  Schedule CT scan    Over 30 minutes spent with this patient, over half in counseling and coordination of care.  Our Care Coordinator Rosemarie Lindsey also met with her today - she will coordinate with [facility] about making sure Pat gets established in one clinic or the other.       Diamond Verdin MD     -------------------------------------------    Chief Complaint   Patient presents with     Other     Dr johnston from [facility] referred her for facial pain      Pat comes to see me today apparently for follow up of trigeminal neuralgia.  She saw internist Dr. Ruiz - very impressed with his care and thoroughness.  Her understanding was that Dr. Ruiz directed her to see a number of different providers, and I was one of them.  I reviewed with her that he had also ordered PT for her, " "ordered a colonoscopy to follow up on her abdominal pain, and referred her to [facility] for better definition of  Her cognitive function.  He prescribed Augmentin to treat sinusitis noted on her MRI from 7/30/16 - , noting that this might be contributing to her facial pain. She was not aware of this prescription and has not picked it up yet    I had seen Kayleigh in July for her concerns of an umbilical hernia, ordered a CT to define the hernia, after which she subsequently had a surgery consultation and ultimately a repair. I saw her back after the surgery on 8/4/16. Additional issues noted in the hospital were confusion and worsening of her baseline dementia.She was seen by neurology and work up in the hospital included MRI, B12, ammonia, cbc and bmp.She has been referred for home care. She noted the following  - her sisters concern for  Alzheimer's dementia  - concerns about her son losing job in taking time to be see her   -  worried about people coming to her house because they will  it as too cramped and make her get rid of things    Because she seemed well spoken but tangential and disorganized, I had referred her to psychiatry.  At the beginning of the visit today she told me she never went.  At the end of the visit she said she had been seeing a psychiatrist Dr. Hassan of [facility].  Her next appt is on February 13th ' she gave me verbal permission to talk to him if I am able to reach him    Notably Dr. Ruiz continued her work up with comp panel, cbc, uric acid, urine culture, TSH, esr, crp , esr - all normal; hypothyroidism controlled on current dose of levothyroxine    Today's follow up   1) Facial pain  I noted the nurse triage note from , part of which is pasted below:    \"Kayleigh has a trigeminal neuralgia at the left side of her face. For years she has been taking Gabapentin for the pain and it isn't \"doing enough anymore\". She has been taking 1200 mg TID and she still hasn't been " "able to eat or sleep due to the pain.\"    Today, she not want any change in her medication (I offered a trail of pregabalin instead of gabapentin) and she says she can make sure she continues to take gabapentin 1200 TID as initially prescribed by Dr. Davila (her pcp her at [facility] prior to me) - she only been taking 600 mg at mid-day. She mainly blames tooth trouble for her pain, and believes nothing will improve until she gets this fixed.  She say dental implants done in [facility] - pain all went away at first .  \"We ultimately did the whole top - all implants and crowns\". A later doctor \"too 2 units out\" - screamed at her about  \"did you do it yourself?f\" and then kept the unit out.  She says her tooth unit slides back and forth and maybe irritating trigeminal nerve. She has not found an endodontist locally     2)  abdominal pain  She has not yet pursued CT Abdomen. She is having a lot of pain - now it is on the right side, before it was on the left side.  She says \"I have to do dishes and lean against the counter and I just want to die.\" No fever, no change in bowel habits.  Also reviewed last weeks normal lab results with her.    3) memory loss  She is tired of her sister harassing her about Alzheimer's. She looks forward to having a definitive test to see whether she has this or not.  Reviewed with her that there was not a single test, MRI and normal labs are reassuring, but further cognitive testing would be helpful as recommended by Dr. Ruiz. However I offered to do MMSE today    MMSE 28/30 - points off for not recalling 2/3 words - all other values including figure drawing were normal  Patient Active Problem List   Diagnosis     Memory Lapses Or Loss     Unable To Restrain Bowel Movement     Hypothyroidism     Moderate Recurrent Major Depression     Cataract     Fuchs' Endothelial Corneal Dystrophy     Decrease In Height     Urinary Incontinence     Trigeminal Neuralgia     Edema     " Joint pain, hip     Joint pain, knee     Lower back pain     Urge incontinence     Irritable bowel disease     Umbilical hernia     Prediabetes     Postoperative hypertension     AZALEA (obstructive sleep apnea)     Morbid obesity     Ventral incisional hernia without gangrene     Confusion         Current Outpatient Prescriptions:      acetaminophen (TYLENOL) 325 MG tablet, Take 650 mg by mouth every 6 (six) hours as needed for pain., Disp: , Rfl:      gabapentin (NEURONTIN) 600 MG tablet, Take 600 mg by mouth 3 (three) times a day. , Disp: , Rfl:      levothyroxine (SYNTHROID, LEVOTHROID) 88 MCG tablet, TAKE ONE TABLET BY MOUTH ONCE DAILY, Disp: 30 tablet, Rfl: 0     amoxicillin-clavulanate (AUGMENTIN) 500-125 mg per tablet, Take 1 tablet (500 mg total) by mouth 2 (two) times a day for 10 days., Disp: 20 tablet, Rfl: 1     methylPREDNISolone (MEDROL DOSEPACK) 4 mg tablet, follow package directions, Disp: 21 tablet, Rfl: 0    Current Facility-Administered Medications:      cyanocobalamin injection 1,000 mcg, 1,000 mcg, Intramuscular, Q30 Days, Jake Ruiz MD, 1,000 mcg at 01/24/17 1451        History   Smoking Status     Former Smoker     Quit date: 7/28/2012   Smokeless Tobacco     Never Used       History   Alcohol Use     Yes     Comment: rarely         Vitals:    01/31/17 1544   BP: (!) 142/92   Pulse: 64   Resp: 20     Body mass index is 37.36 kg/(m^2).     BP Readings from Last 3 Encounters:   01/31/17 (!) 142/92   01/24/17 120/88   01/06/17 138/78       EXAM:    General appearance - alert, well appearing, and in no distress    Mental status - alert, oriented to person, place, and time.  Anxious, tangential, incredibly detailed in descriptions, good use of language, but she also forgets what I have said and loses her train of thought.  She has somewhat staring/over-the-top eye contact.    MMSE score 28 - two points off for forgetting two of the three words I asked her to memorize (she got another with a  "prompt) but completely normal for all the other tests, including \"world\" backwards, orientation, and drawing perfect intersecting pentagons.    Abdomen - soft, mild RUQ tenderness nondistended        "

## 2021-06-08 NOTE — PROGRESS NOTES
ASSESSMENT:  1. Trigeminal neuralgia of left side of face  Reports 6-12 month history of worsening.  Continue gabapentin.  Trial of B12 shots.  Previous B12 level normal.  MRI suggests possible inflammatory sinusitis contributing.  Trial of antibiotics and steroids and rule out underlying conditions  - cyanocobalamin injection 1,000 mcg; Inject 1 mL (1,000 mcg total) into the shoulder, thigh, or buttocks every 30 (thirty) days.    2. Memory loss  Clearly crabby, irritable, distractible, and with questionable memory area and cannot remember home care being arranged after the hospital or thyroid adjustment last summer or several other things when I question her on chart events.  She has awareness but no family members present.  Referral to [facility] for full eval.  Probably needs to quit driving.  Given her early onset, may be a candidate for frontal lobe dementia or Lewy body dementia and treatment.  Discussed initiation of anxiolytic therapy  - Comprehensive Metabolic Panel  - HM2(CBC w/o Differential)  - Urinalysis-UC if Indicated  - Uric Acid  - Erythrocyte Sedimentation Rate  - C-Reactive Protein  - Ambulatory referral to Medication Management  - Ambulatory referral to Geriatrics    3. RUQ abdominal pain  Reviewed umbilical hernia repair and previous CT scan.  Consider diverticulitis or atypical cholelithiasis.  Recheck CT scan and labs  - CT Abdomen Pelvis With Oral With IV Contrast; Future    4. Acute pain of left knee  Knee examination seems to be osteoarthritis but rule out coexisting uric acid arthropathy    5. Hypothyroidism due to acquired atrophy of thyroid  Monitor after dose decrease in July  - Thyroid Stimulating Hormone (TSH)    6. Calcific tendinitis of right shoulder  After injury.  Probably subacute tendon tear.  Recommend physical therapy and trial of Medrol burst  - methylPREDNISolone (MEDROL DOSEPACK) 4 mg tablet; follow package directions  Dispense: 21 tablet; Refill: 0  - Ambulatory  referral to Physical Therapy    7. Left knee pain  Conservative care  - methylPREDNISolone (MEDROL DOSEPACK) 4 mg tablet; follow package directions  Dispense: 21 tablet; Refill: 0  - Ambulatory referral to Physical Therapy    Excellent candidate for medical home and overall aggressive care management    PLAN:  Patient Instructions   You have had a different type of pain since the surgery in July.  We should repeat a CT scan of the abdomen    For memory and abdominal pain, we need lots of blood tests and urine.  They already did an MRI in July    For the trigeminal neuralgia, we need to update blood tests and try a B 12 shot.  We should consider a sinus infection can be triggering this    For the right shoulder, you probably have a calcium deposit in the shoulder.  This can be improved with physical therapy or a cortisone shot, or a round of meds    The left knee may have osteoarthritis.    The memory needs a full eval by a specialist. Dr Hernandez.  Which type, and what meds can help?    Enroll in our healthcare home program.  , nurse, care guide, financial, pharmacist    Medrol dose pack steroid pack for 6 days to decrease inflammation which could help with sinus, nerve, shoulder and knee    augmentin 500 mgs twice daily for 10 days for possible sinus infection, dental infection, and colon infection called diverticulitis    Arrange physical therapy for the shoulder and knee    Should you be on meds for anxiety or depression?  We will wait for the memory specialty opinion      Orders Placed This Encounter   Procedures     Culture, Urine     CT Abdomen Pelvis With Oral With IV Contrast     Standing Status:   Future     Standing Expiration Date:   1/24/2018     Order Specific Question:   Can the procedure be changed per Radiologist protocol?     Answer:   Yes     Thyroid Stimulating Hormone (TSH)     Comprehensive Metabolic Panel     HM2(CBC w/o Differential)     Urinalysis-UC if Indicated     Uric Acid      Erythrocyte Sedimentation Rate     C-Reactive Protein     Ambulatory referral to Medication Management     Referral Priority:   Routine     Referral Type:   Health Education     Referral Reason:   Medication Management     Number of Visits Requested:   1     Ambulatory referral to Geriatrics     Referral Priority:   Routine     Referral Type:   Consultation     Referral Reason:   Evaluation and Treatment     Requested Specialty:   Geriatric Medicine     Number of Visits Requested:   1     Ambulatory referral to Physical Therapy     Referral Priority:   Routine     Referral Type:   Physical Therapy     Referral Reason:   Evaluation and Treatment     Requested Specialty:   Physical Therapy     Number of Visits Requested:   1     Medications Discontinued During This Encounter   Medication Reason     gabapentin (NEURONTIN) 600 MG tablet Duplicate order     magnesium hydroxide (MILK OF MAG) 400 mg/5 mL Susp suspension Error     senna-docusate (PERICOLACE) 8.6-50 mg tablet Error     Administrations This Visit     cyanocobalamin injection 1,000 mcg     Admin Date Action Dose Route Administered By             01/24/2017 Given 1000 mcg Intramuscular Indu Joiner MA                        Return in about 4 weeks (around 2/21/2017).    CHIEF COMPLAINT:  Chief Complaint   Patient presents with     Abdominal Pain     pt had hernia surgery in June. She has had stomach pain ever since     Facial Pain     trigeminal neuralgia, left side facial swelling     Knee Pain     left knee pain that has gotten worse since chiropractor adjustment     Shoulder Pain     pt fell on her right shoulder 2 months ago     Memory Loss     pt thinks she is in early stage of alzheimer's disease       HISTORY OF PRESENT ILLNESS:  Ginger is a 71 y.o. female presenting to the clinic today to establish care. She has previously been seen at the [facility] by Dr. Hensley and also Dr. Rice.     Trigeminal Neuralgia: On the left side of her face.  She has had this for many years and has been able to control her symptoms with gabapentin use. She has taken gabapentin for 30 years and currently takes 600mg--two in the morning and two at night and one in the afternoon. Her symptoms recently have increased and she is unable to sleep at night. She thinks her worsening symptoms may be due to dental work.     Abdominal Pain: She is status post right side hernia surgery in July of last year. She is having intermittent pain on both sides of her stomach although it is lower on the left and higher on the right. Her right side has been worsening over the past month. She has had diverticulitis in the past treated with antibiotics.     Memory Issues: She feels her memory has been declining for the past 6 months but she is particularly noticing it in the past 3 months. Her sister has also noticed memory issues.     Depression: This started when she was 21. She has used Prozac in her early 20s with good results. She is treated by psychiatrist Jad Eubanks.     Left Knee Pain: Present for 9 months to a year.     Right Shoulder Pain: She attributes this to a fall 2 months ago. She does see a chiropractor.     REVIEW OF SYSTEMS:   She has had B-12 injections in the past with positive results to her mood. She does have rhinitis in the left nostril. She denies post nasal drip. She denies a history of gout. She has no urination or bowel issues. She reports 2 inches in height loss.  All other systems are negative.    PFSH:  She does not smoke currently. She believes she quit about 8-10 years ago. She does still drive, although she does not have a car currently. She lives alone in a secure apartment building in [place]. She has a sister who drives her around.   History   Smoking Status     Former Smoker     Quit date: 7/28/2012   Smokeless Tobacco     Never Used       Family History   Problem Relation Age of Onset     Heart disease Mother      Kidney disease Father      Heart  disease Father        Social History     Social History     Marital status: Single     Spouse name: N/A     Number of children: N/A     Years of education: N/A     Occupational History     Not on file.     Social History Main Topics     Smoking status: Former Smoker     Quit date: 7/28/2012     Smokeless tobacco: Never Used     Alcohol use Yes      Comment: rarely     Drug use: No     Sexual activity: Not on file     Other Topics Concern     Not on file     Social History Narrative       Past Surgical History   Procedure Laterality Date     Pr repair flex leg tendon,prim,ea       Description: Flexor Tendon Repair (Each);  Recorded: 07/06/2014;     Pr removal of tonsils,<11 y/o       Description: Tonsillectomy;  Recorded: 07/06/2014;     Total thyroidectomy       Laparoscopic incisional / umbilical / ventral hernia repair N/A 7/29/2016     Procedure: HERNIA REPAIR VENTRAL LAPAROSCOPIC;  Surgeon: Jonathan Watson DO;  Location: [facility] OR;  Service:        Allergies   Allergen Reactions     Oxcarbazepine      Hallucinations (pt does not recall this allergy)       Active Ambulatory Problems     Diagnosis Date Noted     Memory Lapses Or Loss      Unable To Restrain Bowel Movement      Hypothyroidism      Moderate Recurrent Major Depression      Cataract      Fuchs' Endothelial Corneal Dystrophy      Decrease In Height      Urinary Incontinence      Trigeminal Neuralgia      Edema 02/13/2015     Joint pain, hip 02/13/2015     Joint pain, knee 02/13/2015     Lower back pain 02/13/2015     Urge incontinence 05/28/2015     Irritable bowel disease 05/20/2016     Umbilical hernia 07/26/2016     Prediabetes 07/26/2016     Postoperative hypertension      AZALEA (obstructive sleep apnea)      Morbid obesity      Ventral incisional hernia without gangrene      Confusion 07/30/2016     Resolved Ambulatory Problems     Diagnosis Date Noted     Hyperlipidemia      Past Medical History   Diagnosis Date     Cancer      Disease of  "thyroid gland      History of blood transfusion reaction      History of transfusion      Ventral hernia        VITALS:  Vitals:    01/24/17 1349   BP: 120/88   Patient Site: Left Arm   Patient Position: Sitting   Cuff Size: Adult Large   Resp: 20   Weight: 199 lb (90.3 kg)   Height: 5' 1.5\" (1.562 m)     Wt Readings from Last 3 Encounters:   01/24/17 199 lb (90.3 kg)   01/06/17 202 lb 4 oz (91.7 kg)   10/27/16 201 lb (91.2 kg)     Body mass index is 36.99 kg/(m^2).    PHYSICAL EXAM:  Constitutional:  Reveals a tangential, slightly irritable woman.  Vitals:  Per nursing notes.  Ears:  Clear.  Oropharynx:  Without posterior nasal drainage or thrush.  Neck:  Supple, thyroid not palpable.  Cardiac:  Regular rate and rhythm without murmurs, rubs, or gallops. Legs without edema. Palpation of the radial pulse regular.  Lungs: Clear.  Respiratory effort normal.  Abdomen:  Slightly tender right lower and right middle quadrant; nontender on left. Slight persistent left umbilical hernia.    Skin:   Without rash, bruise, or palpable lesions.  Musculoskeletal: Right shoulder: Tender over the AC joint with crepitans with flexion. Knees: Negative straight leg raise in left. Crepitans in left; none in right; no swelling no warmth.   Rheumatologic: Normal joints and nails of the hands.   Neurologic:  Cranial nerves II-XII intact.     Psychiatric:  Mood appropriate, memory intact.     ADDITIONAL HISTORY SUMMARIZED (2): Reviewed OP note of 7/29/16 regarding hernia surgery.   DECISION TO OBTAIN EXTRA INFORMATION (1): None.   RADIOLOGY TESTS (1): Reviewed abdominal CT scan in 7/13/16 and MRI of brain 7/30/16. CT of head ordered.   LABS (1): Labs reviewed. Labs ordered.   MEDICINE TESTS (1): None.  INDEPENDENT REVIEW (2 each): None.     The visit lasted a total of 38 minutes face to face with the patient. Over 50% of the time was spent counseling and educating the patient about her memory and abdominal pain.     Jose CAGLE, am " scribing for and in the presence of, Dr. Ruiz.    I, Dr. Ruiz, personally performed the services described in this documentation, as scribed by Jose Schultz in my presence, and it is both accurate and complete.    MEDICATIONS:  Current Outpatient Prescriptions   Medication Sig Dispense Refill     acetaminophen (TYLENOL) 325 MG tablet Take 650 mg by mouth every 6 (six) hours as needed for pain.       gabapentin (NEURONTIN) 600 MG tablet Take 600 mg by mouth 3 (three) times a day.        levothyroxine (SYNTHROID, LEVOTHROID) 88 MCG tablet TAKE ONE TABLET BY MOUTH ONCE DAILY 30 tablet 0     amoxicillin-clavulanate (AUGMENTIN) 500-125 mg per tablet Take 1 tablet (500 mg total) by mouth 2 (two) times a day for 10 days. 20 tablet 1     methylPREDNISolone (MEDROL DOSEPACK) 4 mg tablet follow package directions 21 tablet 0     Current Facility-Administered Medications   Medication Dose Route Frequency Provider Last Rate Last Dose     cyanocobalamin injection 1,000 mcg  1,000 mcg Intramuscular Q30 Days Jake Ruiz MD   1,000 mcg at 01/24/17 1451       Total data points: 4

## 2021-06-09 NOTE — PROGRESS NOTES
Medication Therapy Management Initial Visit     ASSESSMENT AND PLAN  1. Trigeminal neuralgia  Based on her memory loss it is difficult to assess an accurate history of how well her symptoms are controlled.  However when she is not adhering to her afternoon dose of gabapentin her symptoms of trigeminal neuralgia return.  Provided a small pill box for her to be open to use in the middle of the day in order to take pills with her if she is out of the house.  Discussed importance of adherence to 3 times daily dosing to help prevent pain.  Discussed benefits of adding venlafaxine as this may also help with her nerve pain, and may allow for titration down on dose of gabapentin.   -Provide pillbox to encourage adherence with TID gabapentin     2. Acquired hypothyroidism  Stable, adhering to her current thyroid hormone, she does not have concerns about low energy levels, TSH within normal limits.  Recommend continue current regimen.    3. Moderate Recurrent Major Depression  Currently is working with a therapist which has gone very well for her.  Based on pH Q9 and ALEXANDER 7 she would likely benefit from addition of medication.  She has scheduled visit at the end of April for complete neuropsychiatric testing as she does have positive memory loss symptoms.  When asked about venlafaxine she does have memory that she was previously on this and well tolerated.  Discussed benefits versus risks of this medication.  Due to constantly feeling depressed and that she is growing older, and feeling like a burden on her family as well as anxiety about many things she does not understand, recommend to initiate low-dose venlafaxine as this may also benefit her trigeminal neuralgia as well as provided a boost in energy.  -Administer ALEXANDER 7  -Initiate venlafaxine XR 37.5 mg daily    FOLLOW-UP PLAN  Ginger was advised to follow up by phone in 2 weeks.    The following instructions were given:   Patient Instructions  "  Make sure you are taking your gabapentin three times daily, use the little red pillbox for your middle of the day dose to help prevent trigeminal neuralgia pain throughout the day     Follow up for memory evaluation with Dr. Landaverde as previously scheduled     Start taking venlafaxine 1 capsule daily in the morning       SUBJECTIVE AND OBJECTIVE  Ginger Lewis is a 71 y.o. female who was referred by Jake Ruiz MD for MT services.  Ginger's chief concern today is medication management. Her sister drives her around, did not attend the visit. Not driving right now. She lives independently and feels safe in her home. She does not mind taking medications if they are the right stuff. She has a pill kwame that she fills for 7 days at a time. She take medications 3-4 times daily. She never misses a prescription medication but may skip her supplements. She is on max dose gabapentin. She has found a spot on the back of her skull that she can touch and \"turn off the pain.\" She is easily distracted, and hard to redirect.     Trigeminal neuralgia: she has been diagnosed at 19 years old. She is getting scared because her gabapentin is not lasting as long. She is wondering if she will need to increase how much she is taking. She does not put her midday gabapentin in her pillbox, unclear why. She tries to use that dose mainly as needed and waits to take until she \"needs it.\" She does not feel that gabapentin makes her tired, or \"drugged.\" She may miss her middle dose if she is away from home. Does not remember getting B12 shot at her last appointment. She may use tylenol as needed for headache.     Hypothyroid: stable on levothyroxine 88mcg.     Depression: Works with a psychologist, Jad Hassan PsyD, LP.  August 2016, PHQ9 14, no GAD7 previously.  Today her score is 10. She has been on venlafaxine in the past. She feels like she is a drag on others.     Adherence: Ginger misses several doses of medications per " week. She does use a pill box at home.    This note has been dictated using voice recognition software. Any grammatical or context distortions are unintentional and inherent to the software.    Ginger was provided with follow up instructions, and this care plan was communicated via EMR with her primary care provider, Jake Ruiz MD, and is the authorizing prescriber for this visit.  Direct supervision was available by either the patient's PCP or another available physician when needed.    Time spent: 60 minutes  Level of service: 3    Camron SandersD, BCACP  Medication Management (MTM) Pharmacist  [facility]    We reviewed Ginger's medication list with them, discussing reason for use, directions for use, and potential side effects of each medication.  Indication, safety, efficacy, and convenience was assessed for all reviewed medications.  No environmental factors were noted currently affecting patient.    Current Outpatient Prescriptions   Medication Sig Dispense Refill     acetaminophen (TYLENOL) 325 MG tablet Take 650 mg by mouth every 6 (six) hours as needed for pain.       gabapentin (NEURONTIN) 600 MG tablet Take 1,200 mg by mouth 3 (three) times a day.       levothyroxine (SYNTHROID, LEVOTHROID) 88 MCG tablet TAKE ONE TABLET BY MOUTH ONCE DAILY 90 tablet 0     venlafaxine (EFFEXOR XR) 37.5 MG 24 hr capsule Take 1 capsule (37.5 mg total) by mouth daily. 30 capsule 2     Current Facility-Administered Medications   Medication Dose Route Frequency Provider Last Rate Last Dose     cyanocobalamin injection 1,000 mcg  1,000 mcg Intramuscular Q30 Days Jake Ruiz MD   1,000 mcg at 01/24/17 4589

## 2021-06-09 NOTE — PROGRESS NOTES
"OUT PATIENT CLINICAL SOCIAL WORK: PSYCHOSOCIAL ASSESSMENT    Ginger Lewis   1945 4/5/2017    Primary Language: English  Referral Source: Dr. Jake Ruiz  Person(s) Present at Visit: Patient presents to outpatient services accompanied by her sister Saira.   Goal(s) for Visit: First Consultation  Presenting Concerns: Patient presents to outpatient services for an initial visit with Dr. Landaverde. Patient presents to outpatient services accompanied by her sister Saira. Ginger stated \"that she has been having trouble with her memory\". She mentioned that she is here at the outpatient clinic for a memory test. Patient mentioned that she can't remember information (example: the day of the week), has to write down information & keeps a calender, repeats information, forgets if she took her medication or not each day. Ginger has noticed changes in her memory in the last year. Per sister, patient was diagnosed with advanced dementia in July 2016. Patient's sister is also concerned with her living alone.     PRIMARY DECISION MAKER FOR HEALTHCARE  Patient  Copy of legal documents on chart? NA    Additional Information: None       PATIENT REPRESENTATIVE  Same as above    Additional Contacts: Name: Saira Perez  Relationship: Sister  Home #: [phone]    Primary Decision Maker for Healthcare provides verbal authorization for this clinic to have care coordination conversations with the above contacts as needed: Yes    HEALTHCARE DIRECTIVE/LEGAL ISSUES  Pt has healthcare directive: No    Name: N/A    Contact information: N/A    If yes, copy of documents on chart? NA    Additional Information: None    FINANCIAL INFORMATION  Primary Funding Source: Medicare  Secondary Funding Source: [employer]    Additional Financial Information: None     Financial POA: No    SSI / SSDI: No     Social Security: Yes    : No    VA Benefits: [place]  N/A                      How long: N/A    LTC Insurance: no    Medical " "Assistance (MA) Status:     N/A    County of Residence: [facility] [place])    Waiver Services: No  type:N/A      OCCUPATION / EDUCATION:  Current Employment: None/Retired  Prior Occupation(s): Sales- Financial Planning      BELIEF SYSTEM: [employer]     MEDICAL:  Please see  04/05/2017 consultation from Dr. Landaverde  for complete medical history.  Community MD/Clinic: Dr. Jake Ruiz      Additional Information/Concerns: None     CHEMICAL HEALTH:  Current Tobacco Use: None  Prior Tobacco Use: Patient stated \"that she had no prior tobacco use\". Per medical chart, patient was a former smoker (Quit date: 07/28/2012).   Current Alcohol/Drug Use: None        Treatment: None  Prior Alcohol/Drug Use: None       Treatment: None  Additional Information/Concerns:  None    PSYCHIATRIC / BEHAVIORAL:  Current Dx: Dx/How long: Advanced Dementia/ diagnosed July 2016  Current Treatment: None   Prior Dx: Dx/How long: Advanced Dementia/ diagnosed July 2016  Prior treatment: None  Additional Information/Concerns: Per family, patient was diagnosed with Advanced Dementia July 2016.     HOME / LIVING ENVIRONMENT:  Patient lives: Alone  Home Accessibility Concerns: None  Additional Information/Concerns:Patient mentioned that her apartment has too much stuff in it and that she needs to clean it out.     COMMUNITY / SOCIAL SUPPORT:   Community services: None    Additional Information/Concerns: None     FAMILY SUPPORT:  Relationship Status: Single  Children: Patient has one son.  Additional Information/Concerns: None   Strengths: Patient has a great support system.     DAILY ROUTINE:  Sleep- Patient sometimes has trouble sleeping at night when she is in pain. Patient stated \"that on occasion she will go watch tv when she unable to sleep at night\".      Nutrition- Patient's nutrition is pretty well.   Hobbies- Patient enjoys watching tv. Patient stated \"that she enjoys doing crafts but is unable to because of her poor eye " "sight and there being too much stuff in her apartment\".   Additional Information/Concerns: None     FUNCTIONAL STATUS:  Is patient driving? No  Additional Information: None  Is patient independent with the following activities? Bathing, Dressing, Grooming, Toileting, Eating, Mobility, Home Making, Medication Management, Meal Preparation, Shopping and Financial Management  Additional Information/Concerns: None       PRIOR COGNITIVE TESTING / IMAGING: None     INTERVENTION(S):  Assessment and Resources  Additional Information: Writer provided the patient with the memory loss resource folder.     PATIENT/FAMILY OBSERVATIONS:  Patient: Patient is well groomed and dress approprietly for the weather. Patient is cooperative and willing to answer any questions that the writer has for her.    Family/Caregiver: Patient's sister Saira is friendly and kind. She has an insight into her sister's condition.     PLAN/FOLLOW-UP: Social Work will follow-up with patient and/or family as requested.    Agnieszka Kraft, Social Work Intern   04/05/2017   2:09 PM                 "

## 2021-06-09 NOTE — PROGRESS NOTES
[facility] Certification Request    March 14, 2017      Patient: Ginger Lewis  MR Number: [id number]  YOB: 1945  Date of Visit: 3/14/2017      Dear Dr. Ruiz:    Thank you for this referral.   We are seeing Ginger Lewis for Physical Therapy of Right shoulder pain and Left knee pain.    Medicare and/or Medicaid requires physician review and approval of the treatment plan. Please review the plan of care and verify that you agree with the therapy plan of care by co-signing this note.      Plan of Care  Authorization / Certification Start Date: 03/14/17  Authorization / Certification End Date: 06/12/17  Authorization / Certification Number of Visits: 12  Communication with: Referral Source  Patient Related Instruction: Nature of Condition;Treatment plan and rationale;Self Care instruction;Basis of treatment;Posture;Precautions;Next steps;Expected outcome  Number of Visits: up to 12 visits  Therapeutic Exercise: ROM;Stretching;Strengthening  Neuromuscular Reeducation: kinesio tape;posture;balance/proprioception  Manual Therapy: soft tissue mobilization;myofascial release;joint mobilization  Modalities: TENS;ultrasound;cold pack;hot pack  Equipment: theraband    Goals:  Pt. will demonstrate/verbalize independence in self-management of condition in : 4 weeks  Pt. will be independent with home exercise program in : 4 weeks  Patient will reach / maintain arm movement: overhead;behind;for home chores;with full ROM;with less pain;with less difficulty;in 6 weeks  No Data Recorded      If you have any questions or concerns, please don't hesitate to call.    Sincerely,      Chrissy Hu, PT        Physician recommendation:     ___ Follow therapist's recommendation        ___ Modify therapy      *Physician co-signature indicates they certify the need for these services furnished within this plan and while under their care.        [facility]   Initial Evaluation    Patient Name:  Case Management will contact insurance to obtain authorization. Case Management will collaborate with the Mental Health Counselor in discharge planning. Ginger Lewis  Date of evaluation: 3/14/2017  Referral Diagnosis: Calcific tendinitis of right shoulder  Referring provider: Jake Ruiz MD  Visit Diagnosis:     ICD-10-CM    1. Acute pain of right shoulder M25.511    2. Chronic pain of left knee M25.562     G89.29    3. Decreased ROM of left knee M25.662    4. Muscle weakness (generalized) M62.81        Assessment:      Impairments in  pain, posture, ROM, joint mobility, strength, motor function  Patient's signs and symptoms are consistent with right shoulder pain most likley due to impingement. Left knee pain most consistent with OA  .  Barriers to achieving goals as noted in the assessment section may affect outcome.  Prognosis to achieve goals is  fair   Skilled PT is required to decrease pain and improve overall ROM, mobility, and strength for improved tolerance to her functional activities   Pt. is appropriate for skilled PT intervention as outlined in the Plan of Care (POC).  Pt. is a good candidate for skilled PT services to improve pain levels and function.    Goals:  Pt. will demonstrate/verbalize independence in self-management of condition in : 4 weeks  Pt. will be independent with home exercise program in : 4 weeks  Patient will reach / maintain arm movement: overhead;behind;for home chores;with full ROM;with less pain;with less difficulty;in 6 weeks  No Data Recorded    Patient's expectations/goals are realistic.    Barriers to Learning or Achieving Goals:  Co-morbidities or other medical factors.  Memory loss        Plan / Patient Instructions:        Plan of Care:   Authorization / Certification Start Date: 03/14/17  Authorization / Certification End Date: 06/12/17  Authorization / Certification Number of Visits: 12  Communication with: Referral Source  Patient Related Instruction: Nature of Condition;Treatment plan and rationale;Self Care instruction;Basis of treatment;Posture;Precautions;Next steps;Expected outcome  Number of Visits: up  "to 12 visits  Therapeutic Exercise: ROM;Stretching;Strengthening  Neuromuscular Reeducation: kinesio tape;posture;balance/proprioception  Manual Therapy: soft tissue mobilization;myofascial release;joint mobilization  Modalities: TENS;ultrasound;cold pack;hot pack  Equipment: theraband    Plan for next visit: Continue with POC: for (R) shoulder work on improving ROM with AAROM exercises and manual mobilizations as needed. Also RC and scapular strengthening as tolerated. For (L) knee, work on improving quadriceps and gluteal strength and ROM as tolerated      Subjective:         Social information:   Living Situation:apartment and lives alone   Occupation:retired   Work Status:NA   Equipment Available: SE cane    History of Present Illness:    Ginger is a 71 y.o. female who presents to therapy today with complaints of right shoulder and left knee pain.    Pt reports (R) shoulder pain started after she fell on the ice in 2016. Pt reports she landed on her right side and pain started immediately. Pt was seeing a chiropractor at the time and reports the chiropractor did adjust her shoulder a few time but she reports it started to get worse. Pt reports pain is worse with overhead movements, lifting, writing with her right hand, and changing sleeping position. Pain is better when she is sitting or lying down with her arm supported by pillows.     Pt also reports left hip and knee pain. Pt reports the knee pain has been going on for a while, but has gotten worse over the last year. Pt reports pain comes and goes. Worse with walking or standing for longer periods of time and stair negotiation. Pt reports she always carries a cane with her, though she doesn't always use it.     Pt's goal for therapy is to \"avoid future injuries and heal current shoulder injury\".       Pain Ratin}  Pain rating at best: 0  Pain rating at worst: 7    Functional limitations are described as occurring with:   ascending and " descending stairs or curbs  lifting  personal cares donning shirt or jacket, donning bra, combing hair and washing hair  reaching overhead and behind back  performing routine daily activities  sleeping  standing   transitional movements getting in  car and getting out of  car  walking    Patient reports benefit from:  rest         Objective:      Patient Outcome Measures :  Not done due to time constraints, will complete next time    Examination  1. Acute pain of right shoulder     2. Chronic pain of left knee     3. Decreased ROM of left knee     4. Muscle weakness (generalized)       Involved side: Right shoulder, left knee  Posture Observation: Significant rounded shoulders with bilateral scapular protraction     Shoulder/Elbow ROM  Date: 3/14/2017     Shoulder and Elbow ROM ( )   AROM in degrees AROM in degrees AROM in degrees    Right Left Right Left Right Left   Shoulder Flexion (0-180 ) WFL, pain at end range WFL       Shoulder Abduction (0-180 ) WFL, painful arc WFL       Shoulder Extension (0-60 )         Shoulder ER (0-90 ) 50 (Seated) WFL       Shoulder IR (0-70 ) Thumb about T12/L1 WFL       Shoulder IR/EXT         Elbow Flexion (150 )         Elbow Extension (0 )          PROM in degrees PROM in degrees PROM in degrees    Right Left Right Left Right Left   Shoulder Flexion (0-180 )         Shoulder Abduction (0-180 )         Shoulder Extension (0-60 )         Shoulder ER (0-90 )         Shoulder IR (0-70 )         Elbow Flexion (150 )         Elbow Extension (0 )           Shoulder/Elbow Strength  Date: 3/14/2017     Shoulder/Elbow Strength (/5)  Manual Muscle Test (MMT) MMT MMT MMT    Right Left Right Left Right Left   Shoulder Flexion 5 5       Supraspinatus         Shoulder Abduction 5- 5       Shoulder Extension         Shoulder External Rotation 4- 5       Shoulder Internal Rotation 5, pain 5       Elbow Flexion         Elbow Extension         Other:         Other:             Knee ROM:    Date:  3/14/2017      Knee ROM ( ) AROM in degrees AROM in degrees AROM in degrees    Right Left Right Left Right Left   Knee Flexion (0-130 ) 122 90       Knee extension (0 ) 0 -8        PROM in degrees PROM in degrees PROM in degrees    Right Left Right Left Right Left   Knee Flexion (0-130 )         Knee extension (0 )             Palpation: Moderate TTP anterior aspect of (R) shoulder, particaulry around long head of biceps tendon. Also diffuse bony tenderness around (L) knee     Special tests: (+) Hawkin's-Mauro, Speed's, painful arc, and infraspinatus test on the right shoulder. (+) for left knee OA (bony tenderness, bony enlargement, stiffness > sitting 30 minutes)       Treatment Today     TREATMENT MINUTES COMMENTS   Evaluation 30 (R) shoulder and (L) knee    Self-care/ Home management     Manual therapy     Neuromuscular Re-education     Therapeutic Activity     Therapeutic Exercises     Gait training     Modality__________________                Total 30    Blank areas are intentional and mean the treatment did not include these items.     PT Evaluation Code: (Please list factors)  Patient History/Comorbidities: Memory loss, hypertension, obesity   Examination: Right shoulder and left knee   Clinical Presentation: Stable   Clinical Decision Making: Low     Patient History/  Comorbidities Examination  (body structures and functions, activity limitations, and/or participation restrictions) Clinical Presentation Clinical Decision Making (Complexity)   No documented Comorbidities or personal factors 1-2 Elements Stable and/or uncomplicated Low   1-2 documented comorbidities or personal factor 3 Elements Evolving clinical presentation with changing characteristics Moderate   3-4 documented comorbidities or personal factors 4 or more Unstable and unpredictable High              Chrissy Hu  3/14/2017  1:42 PM

## 2021-06-09 NOTE — PROGRESS NOTES
DC Plan:  Discharge home today with [facility]    Chart reviewed. Met with patient and spouse at bedside. Pt lives with his spouse who will assist. Pts wife will drive him home at discharge. Pt offered [facility] for HH. Selina Prabhakar Pt chose [facility]. Referral will be placed with CMS for assistance. Pt did not have RW. Pt give RW by CM. Pt states he has not received RW through insurance in last 5 years and aware he would be charged for walker if he had. No other concerns identified. CM will cont to be available, as needed. Care Management Interventions  PCP Verified by CM: Yes  Mode of Transport at Discharge:  Other (see comment) (family)  Transition of Care Consult (CM Consult): [address]: No  Reason Outside [place]: Physician referred to specific agency ([facility])  Physical Therapy Consult: Yes  Occupational Therapy Consult: Yes  Current Support Network: Lives with Spouse  Confirm Follow Up Transport: Family  Plan discussed with Pt/Family/Caregiver: Yes  Freedom of Choice Offered: Yes  Discharge Location  Discharge Placement: Home with home health [facility] Outpatient Consultation    Assessment / Impression:   1.  Amnestic MCI by history, rule out dementia the Alzheimer type  2.  History of postoperative delirium  3.  Trigeminal neuralgia  4.  History of thyroid cancer  5.  Hypothyroidism  6.  Fibromyalgia  7.  History of depression  8.  Degenerative arthritis  9.  Medical record indicates history of AZALEA however patient and family deny such a prior diagnosis    Plan:   1.  Neuropsychometric testing  2.  Review screening blood work    A long conversation held with the patient and sister Saira in attendance.  This patient with a possible two-year history of mild difficulty with short-term recall developed delirium following elective hernia repair in July 2016.  Resolving confusion by discharge home 5 days after surgery, the patient has persisted in having some difficulty with short-term recall that has interfered with her confidence with respect to medication compliance.  At this point in time history and exam is a bit suggestive of a neuro degenerative illness it would be an early clinically manifest stage.  As the patient has had structural imaging of the brain within the past 12 months given her current clinical presentation, I believe neuropsychometric testing is all that would be required at this point in time with consideration for further diagnostics depending on results.  In the meantime I have given the patient advised with respect to general management.  All questions answered.    Total time for evaluation one hour with the majority time spent in counseling with respect to the matters of cognitive impairment, dementia, differential diagnosis, workup, treatment etc.  All questions answered.      Subjective:     HPI: Ginger Lewis is a 71 y.o. female with above-noted diagnoses who is referred for cognitive evaluation.  The patient is accompanied by 1 of her sisters, Saira, who reports that she noted mild difficulty her sister was having with  short-term recall dating back approximately 2 years.  Fairly static in nature, there is a history in the medical record of the patient possibly having some mild difficulty with spatial orientation on occasion.  Admitted to the hospital in July for elective ventral hernia repair, the patient's postoperative course was remarkable for confusion for which neurology consultation was obtained.  According to Saira, the patient was back to her baseline cognitively following return home 5 days postoperatively.  Since that time she has continued to demonstrate some mild difficulty with short-term memory that likely has worsened to some extent.  As noted above, the patient herself is beginning to notice that she is having trouble managing her medications and is somewhat concerned that she may overdose on some of her medicines because of this difficulty.  Is because of these concerns of the patient presents now for further evaluation.    The patient presents as a middle-aged/elderly female who appears in no acute physical distress.  Ambulating with the aid of a cane the patient does have an antalgic gait secondary to arthritis.  She reports difficulty with short-term recall most notable when managing her multiple doses of medicines during the day.  Both the patient and sister are confident that the patient in fact remains independent at this time and cognitively capable of managing her daily routine without assistance.    On further questioning the patient denies loss of consciousness, orthostasis, visual illusions or hallucinations, tremors, falls.  No symptoms suggesting autonomic insufficiency identified.  The remainder of her general medical review of systems is remarkable for arthritic complaints.  She also complains of left facial pain attributable to trigeminal neuralgia.    Plan:       Subjective:       Past Medical History:   As noted above born in [place], the patient completed high school education.    Social  History:   She is currently not .  She does have 1 grown child.  As noted, the patient is residing in her own apartment where she remains completely independent albeit with increasing concern regarding matters related to short-term recall.    Past Psychiatric History:   The patient reports a long-standing history of mild depression for which she has been managed on an outpatient basis with medication therapies along.  Otherwise, there is no history of mental illness substance abuse or chemical dependency.    Family History:   Remarkable for late in life dementia in the mother    Review of Systems:  Pertinent items are noted in HPI.    Objective:   As above    Vitals:    04/05/17 1359   BP: 168/79   Pulse: (!) 51   Weight: 201 lb (91.2 kg)     Physical Exam:    Skin exam without rashes or lesions.  HEENT exam reveals a mild amount of left facial weakness.  Occasional lancing pain reported by the patient during exam.  Oropharynx with no retained oral secretions or drooling.  No adenopathy noted on cervical exam.  Lungs are clear to auscultation with normal work of breathing.  Cardiac exam with a regular S1-S2.  Abdomen soft nontender.  Extremities without significant edema.    Neurological Exam:     Cranial nerve exam is remarkable for what appears to be a mild degree of left facial weakness.  [place] type pains are noted consistent with that secondary to trigeminal neuralgia.  I note no other focality on limited exam given patient's current pain.  Her station is with a slightly wide-based.  Gait is antalgic favoring the right lower extremity.  The patient does ambulate with the assistance of a cane.  Postural reflexes do appear to be adequate.    Cognitive Evaluation:     Patient is neatly groomed casually dressed and seated for evaluation.  I noted to be alert pleasant and socially appropriate.  Speech is fluent and well articulated with no evidence of word finding hesitations.  No motor speech problems  noted.  General fund of knowledge appears to be intact slightly reduced.  Insight is preserved.  No obvious difficulty with short-term memory noted at this time however specific testing was not undertaken.  No evidence of overt confusion including fluctuating level of consciousness as well as distractibility.  Mood is slightly anxious affect is pleasant.  No evidence of abnormal thought content or form.  No evidence of visual or auditory hallucinations.  No unusual ideations or behaviors.    Medications:  Scheduled Meds:    cyanocobalamin  1,000 mcg Intramuscular Q30 Days     Continuous Infusions:   PRN Meds:.    Jose Guadalupe Landaverde MD  4/5/2017

## 2021-06-10 NOTE — PROGRESS NOTES
ASSESSMENT:  1. Memory loss  Complicating treatment.  She did see [facility] which she found unhelpful.  She was started on venlafaxine by Pharm.D. however it is not clear that she tried this.  Obviously her memory and lack of support system is complicating her care.  Enroll in healthcare home to help coordinate plan.  Initiate medications    Discussed with her that high doses of gabapentin could be making the situation worse.  In the absence of neurocognitive testing, I think we should empirically adjust medicines    2. Trigeminal neuralgia  Repeat B12 shot.  Trial of Cymbalta to replace venlafaxine.  Trial of decreased gabapentin    3. Acquired hypothyroidism  TSH high normal.  Trial of higher dose thyroid to augment description and discussion as above  - levothyroxine (SYNTHROID, LEVOTHROID) 100 MCG tablet; TAKE ONE TABLET BY MOUTH ONCE DAILY  Dispense: 90 tablet; Refill: 3    Shoulder pain.  Initial therapy intake noted but she did not continue.  Repeat trial of physical therapy for presumed right shoulder calcific tendinitis and rotator cuff tear    PLAN:  Patient Instructions   Resume physical therapy for shoulder    aricept 5 mgs each evening for memory    Increase dose of  thyroid medication for energy and mood and memory--increase your thyroid from 88 mcg daily to 100 mcg daily    Decrease Gabapentin to 1 pill 3 times a day to lessen the brain fog effect    Do a B 12 shot for trigeminal neuralgia, and do them monthly    Cymbalta 30 mgs each morning for mood, and energy and trigeminal neuralgia pain    Do not take the Venlafaxine previously prescribed    See Chuyita in 2 weeks to adjust the medicine    See me in 4 weeks    Enroll in our healthcare home to help coordinate all these activities.  Meet Melania today                  No orders of the defined types were placed in this encounter.    Medications Discontinued During This Encounter   Medication Reason     venlafaxine (EFFEXOR XR) 37.5 MG 24 hr  capsule      gabapentin (NEURONTIN) 600 MG tablet Reorder     levothyroxine (SYNTHROID, LEVOTHROID) 88 MCG tablet Reorder     Administrations This Visit     cyanocobalamin injection 1,000 mcg     Admin Date Action Dose Route Administered By             04/24/2017 Given 1000 mcg Intramuscular Indu Joiner MA                        No Follow-up on file.    CHIEF COMPLAINT:  Chief Complaint   Patient presents with     Follow-up       HISTORY OF PRESENT ILLNESS:  Ginger is a 71 y.o. female presenting to the clinic today for follow up on memory loss. She saw Dr. Landaverde earlier this month who recommended neuropsychometric testing, though she is not able to get in until October.  She mentions that she usually is only able to get through half a thought. She loses her train of thought and feels that her trailing off makes her a very poor conversationalist, alienating people. She feels depressed and isolated. She watches TV and reads books, but has very little interactions with other people, aside from [employer]. She has taken up walking but feels stuck in her life right now as she no longer drives. She also feels stuck financially. She mentions that her energy wanes on some days, but usually her energy reserves are adequate.  She is also followed by a psychiatrist, Dr. Hassan. She was prescribed venlafaxine by Dr. Grewal, though she did not start it.    Right arm pain:  She slipped on the ice in November and has had lingering pain since that time. She was seen by a chiropractor at some point who, after x-raying the shoulder, diagnosed her with a shoulder dislocation which was reduced. However, since that time she has had persistent arm weakness with concomitant arm pain. She would like to explore her options and avoid surgery. She was referred to our PT center but due vague issues with scheduling did not follow up.     Trigeminal neuralgia:  She currently takes gabapentin 1,200 three time a day, which she thinks helps  "manage her trigeminal neuralgia. She found B12 helpful as she attributes her improvement in mood to the injection.     Abdominal pain:  She continues to have intermittent episodes of abdominal pain. She also notices \"hard lumps\" scattered throughout her abdomen. The pain is often provoked by bending over.     REVIEW OF SYSTEMS:   She continues to suffer from left hip pain and knee pain.   All other systems are negative.    PFSH:  She lives alone. She does not drive.   She does not have a computer at home.   Her mother had a history of Alzheimer's disease.     TOBACCO USE:  History   Smoking Status     Former Smoker     Quit date: 7/28/2012   Smokeless Tobacco     Never Used       VITALS:  Vitals:    04/24/17 1354   BP: 130/78   Pulse: (!) 57   Resp: 16   Weight: 199 lb 9.6 oz (90.5 kg)   Height: 5' 1.5\" (1.562 m)     Wt Readings from Last 3 Encounters:   04/24/17 199 lb 9.6 oz (90.5 kg)   04/05/17 201 lb (91.2 kg)   01/31/17 201 lb (91.2 kg)       PHYSICAL EXAM:  Constitutional:  Reveals an irritable woman. Some very reasonable short term memory noted. Some clear deficits noted.  Vitals:  Per nursing notes.  Eyes: Bilateral exophthalmos noted   Cardiac:  Regular rate and rhythm without murmurs, rubs, or gallops. Legs without edema. Palpation of the radial pulse regular.  Lungs: Clear.  Respiratory effort normal.  Skin:   Without rash, bruise, or palpable lesions.  Rheumatologic: Normal joints and nails of the hands.  Neurologic:  Cranial nerves II-XII intact.     Psychiatric:  Mood appropriate, memory intact.     QUALITY MEASURES:  The following are part of a depression follow up plan for the patient:  under care of mental health counselor and taking history of mental health management    ADDITIONAL HISTORY SUMMARIZED (2): Notes from Dr. Landaverde reviewed from 4/5/2017 regarding memory loss. Notes from Dr. Grewal reviewed from 3/14/2017 regarding medication management  DECISION TO OBTAIN EXTRA INFORMATION (1): CT " reviewed from 2/21/2017 .   RADIOLOGY TESTS (1): None.  LABS (1): Labs reviewed from 1/2017.  MEDICINE TESTS (1): None.  INDEPENDENT REVIEW (2 each): None.     The visit lasted a total of 22 minutes face to face with the patient. Over 50% of the time was spent counseling and educating the patient about memory loss.    IYonas, am scribing for and in the presence of, Dr. Ruiz.    I, Dr. Ruiz, personally performed the services described in this documentation, as scribed by Yonas Desai in my presence, and it is both accurate and complete.    MEDICATIONS:  Current Outpatient Prescriptions   Medication Sig Dispense Refill     acetaminophen (TYLENOL) 325 MG tablet Take 650 mg by mouth every 6 (six) hours as needed for pain.       gabapentin (NEURONTIN) 600 MG tablet Take 1 tablet (600 mg total) by mouth 3 (three) times a day.  0     levothyroxine (SYNTHROID, LEVOTHROID) 100 MCG tablet TAKE ONE TABLET BY MOUTH ONCE DAILY 90 tablet 3     donepezil (ARICEPT) 5 MG tablet Take 1 tablet (5 mg total) by mouth at bedtime. 30 tablet 2     DULoxetine (CYMBALTA) 30 MG capsule Take 1 capsule (30 mg total) by mouth daily. 30 capsule 2     Current Facility-Administered Medications   Medication Dose Route Frequency Provider Last Rate Last Dose     cyanocobalamin injection 1,000 mcg  1,000 mcg Intramuscular Q30 Days Jake Ruiz MD   1,000 mcg at 04/24/17 1739       Total data points: 4.   TIME IN:2:11 PM  TIME OUT: 2:33 PM

## 2021-06-10 NOTE — PROGRESS NOTES
Meet with pt and sister. Pt is from [address] and has changed providers. Pt is getting dementia pretty bad I spent a hour just going over and over things I asked the sister to now accompany her in appointments so things are relayed correctly pt agreed to this. I gave her my card. We also added minutes on her [employer] phone. Pt's memory seems to be getting worse and I will assist as much as needed.

## 2021-06-10 NOTE — PROGRESS NOTES
Medication Therapy Management Follow Up Visit     ASSESSMENT AND PLAN  1. Memory Lapses Or Loss  Very difficult to obtain history and she is very frustrated with her situation with lack of family support and feeling that she has no one to look after her. She is very reluctant to start new medications in case she has a severe reaction. Discussed holding off on aricept until she understands how duloxetine may affect her. She is on board with starting a medication to help slow progress of memory loss.   -recommend to wait to start aricept until effects of duloxetine are realized    2. Acquired hypothyroidism  TSH is elevated and notes fatigue, however did not change dose of levothyroxine. Encouraged her to do so when she returns home to improve energy and mood.   -Increase levothyroxine as directed at previous visit     3. Trigeminal neuralgia  Discussed differences between venlafaxine and duloxetine, both of which are SNRIs and can be used for pain, mental alertness, depression. Discussed benefits vs risks of this medicatio and she will decide if she is going to start this, although she did pick this up from the pharmacy.   -initiate duloxetine     4. Right shoulder pain  Will be seeing PT today, will defer further recommendations for their assessment. Discussed that her fatigue may be due to large doses of gabapentin, which she did not decrease after visit with Dr. Ruiz. Recommend to decrease dose to 600mg TID as previously directed.   -Decrease gabapentin to 600mg TID as needed       FOLLOW-UP PLAN  Ginger was advised to follow up by phone for questions about medication adjustments, and in two weeks with Dr. Ruiz.    The following instructions were given:   Patient Instructions   Schedule appointment with Dr. Ruiz in 2 weeks     For Pain use Tylenol (acetaminophen)     Do not use ibuprofen, Advil, Aleve, naproxen (for now)     Increase levothyroxine to 100mcg daily     Decrease  "gabapentin to 1 tab (600mg) in the morning and then as needed during the day (this could be making you tired and have a hard time thinking clearly)     Start taking duloxetine, this should help with pain (since you are decreasing dose of gabapentin)     Do not start donepezil (for memory) until we see how you feel with duloxetine (for pain)     Please call to discuss concerns:   Jose Grewal, PharmD, [facility]  Medication Management (MTM) Pharmacist  [facility]  [phone]       SUBJECTIVE AND OBJECTIVE  Ginger Lewis is a 71 y.o. female who was referred by Jake Ruiz MD for MTM services.  Ginger's chief concern today is medication follow up. She picked up two prescriptions (donepezil, duloxetine) from the pharmacy and read all of the warnings from these medications and she will not take either of them.     Memory loss: marked symptoms and frustration with medication changes and side effects that she reads about. She is \"all alone and may go days without anyone ever checking on her.\"     Hypothyroid: Because she didn't take these new pills she also decided not to change her levothyroxine dose either, which I confirmed with her current pillbox set up.   Lab Results   Component Value Date    TSH 4.37 01/24/2017     Trigeminal Neuralgia: No longer has severe trigeminal neuralgia, and does not understand why she needs the duloxetine for this indication.     Right arm pain: PT today. Taking gabapentin for pain. Takes 1200mg daily in the morning and at night. Tired all the time. Dr. ruiz had told her to decrease to 600mg three times daily but she has also not done this.     Adherence: Ginger misses several doses of medications per week. She does use a pill box at home.    This note has been dictated using voice recognition software. Any grammatical or context distortions are unintentional and inherent to the software.    Ginger was provided follow up instructions, and this care plan was " communicated via EMR with her primary care provider, Jake Ruiz MD, and is the authorizing prescriber for this visit.  Direct supervision was available by either the patient's PCP or another available physician when needed.    Time spent: 30 minutes      Jose Grewal PharmD, [facility]  Medication Management (MTM) Pharmacist  [facility]    We reviewed Ginger's medication list with them, discussing reason for use, directions for use, and potential side effects of each medication.  Indication, safety, efficacy, and convenience was assessed for all reviewed medications.  No environmental factors were noted currently affecting patient.    Current Outpatient Prescriptions   Medication Sig Dispense Refill     acetaminophen (TYLENOL) 325 MG tablet Take 650 mg by mouth every 6 (six) hours as needed for pain.       DULoxetine (CYMBALTA) 30 MG capsule Take 1 capsule (30 mg total) by mouth daily. 30 capsule 2     gabapentin (NEURONTIN) 600 MG tablet Take 1 tablet (600 mg total) by mouth 3 (three) times a day.  0     levothyroxine (SYNTHROID, LEVOTHROID) 100 MCG tablet TAKE ONE TABLET BY MOUTH ONCE DAILY 90 tablet 3     Current Facility-Administered Medications   Medication Dose Route Frequency Provider Last Rate Last Dose     cyanocobalamin injection 1,000 mcg  1,000 mcg Intramuscular Q30 Days Jake Ruiz MD   1,000 mcg at 04/24/17 2119

## 2021-06-10 NOTE — PROGRESS NOTES
"[facility] Daily Progress     Patient Name: Ginger Lewis  Date: 2017  Visit #:  through  (Medicare certification)  PTA visit #:    Referral Diagnosis: Calcific tendinitis of right shoulder  Referring provider: Jake Ruiz MD  Visit Diagnosis:     ICD-10-CM    1. Acute pain of right shoulder M25.511    2. Chronic pain of left knee M25.562     G89.29    3. Decreased ROM of left knee M25.662    4. Muscle weakness (generalized) M62.81          Assessment:     Patient is appropriate to continue with skilled physical therapy intervention, as indicated by initial plan of care.   Pt initially quite frustrated that \"no one has helped me with this yet.\" Pt with good tolerance for manual therapy, reporting mildly decreased pain with overhead flexion post treatment. Good tolerance for all ex. Denies pain with all ex, minus mild soreness with ER isometric.     Goal Status:  Pt. will demonstrate/verbalize independence in self-management of condition in : 4 weeks  Pt. will be independent with home exercise program in : 4 weeks  Patient will reach / maintain arm movement: overhead;behind;for home chores;with full ROM;with less pain;with less difficulty;in 6 weeks    Plan / Patient Education:     Review newly added ex.  Assess KTape effectiveness.  Continue manual per below to stretch R shoulder posterior capsule and decrease anterior shoulder pain.  Plan to continue to progress shoulder ER strength as tolerated for improved GH stability.    Subjective:     Pain Ratin  Right shoulder continues to be very painful. Hard to use. Went to  bags from the floor after previous clinic appointment and felt a huge strain in the shoulder. Very painful and \"no one is helping to do anything about it.\"    Objective:     Right shoulder is significantly lower than the left as noted in static standing; however, no step-down deformity observed.  R UE held into IR position in standing as compared to the " left.  R shoulder AROM with only mild loss throughout all motions as compared to the left; however, patient with slow, guarded movements and especially hesitant with IR AROM.  R GH accessory motion WNL and NP minus hypomobility noted with posterior glide.  R shoulder PROM Flexion: mild>moderate loss with ERP.     ABD: moderate loss with ERP.     ER moderate loss with ERP.     IR with mild loss with ERP.  PROM testing difficult to assess due to patient guarding.  TTP anterior shoulder most notably over GH joint.    Added pendulums, doorway anterior shoulder stretch, doorway ER isometric, and scapular retractions to HEP.  KTape applied to the shoulder as well, with patient education provided on benefits, mechanism, use, and precautions.    Treatment Today     TREATMENT MINUTES COMMENTS   Evaluation     Self-care/ Home management     Manual therapy 15 STM to anterior shoulder where TTP is present.  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   Neuromuscular Re-education 5 KTape for R shoulder pain and instability: 2 pieces along megan- and posterolateral portions of the upper arm/shoulder and decompressive piece across lateral deltoid.   Therapeutic Activity     Therapeutic Exercises 35 ROM recheck  HEP initiated with handouts provided   Gait training     Modality__________________                Total 55    Blank areas are intentional and mean the treatment did not include these items.       Shoaib Acosta  5/8/2017

## 2021-06-11 NOTE — PROGRESS NOTES
[facility] Daily Progress     Patient Name: Ginger Lewis  Date: 2017  Visit #:  through  (Medicare certification)  PTA visit #:    Referral Diagnosis: Calcific tendinitis of right shoulder  Referring provider: Jake Ruiz MD  Visit Diagnosis:     ICD-10-CM    1. Acute pain of right shoulder M25.511    2. Chronic pain of left knee M25.562     G89.29    3. Decreased ROM of left knee M25.662    4. Muscle weakness (generalized) M62.81          Assessment:     Patient is appropriate to continue with skilled physical therapy intervention, as indicated by initial plan of care.   Despite poor HEP compliance, patient is demonstrating an improved tolerance for active movement of the R shoulder, although with continued significant loss of AROM.    Goal Status:  Pt. will demonstrate/verbalize independence in self-management of condition in : 4 weeks  Pt. will be independent with home exercise program in : 4 weeks  Patient will reach / maintain arm movement: overhead;behind;for home chores;with full ROM;with less pain;with less difficulty;in 6 weeks    Plan / Patient Education:     Review newly added ex.  Continue manual per below to stretch R shoulder posterior capsule and decrease anterior shoulder pain.  Plan to continue to progress shoulder ER strength as tolerated for improved GH stability.    Subjective:     Pain Ratin   Really without much change since last visit. Feeling very depressed about the shoulder. Requires help for most bathing and dressing due to pain and lack of strength.    Objective:     Continued significant verbal and tactile cueing required for correct performance of ex. Pt acknowledges poor compliance with HEP. Encouraged to place pictures on her refrigerator or bathroom mirror as a reminder to perform ex daily, considering current cognitive deficits.  Added YTB ER AROM to HEP.    Treatment Today     TREATMENT MINUTES COMMENTS   Evaluation     Self-care/ Home  management     Manual therapy  STM to anterior shoulder where TTP is present.  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  (not performed today)   Neuromuscular Re-education     Therapeutic Activity     Therapeutic Exercises 26 See exercise flow sheet   Gait training     Modality__________________                Total 26    Blank areas are intentional and mean the treatment did not include these items.       Shoaib Acosta  6/6/2017

## 2021-06-11 NOTE — PROGRESS NOTES
[facility] Daily Progress     Patient Name: Ginger Lewis  Date: 7/14/2017  Visit #: 8/12   PTA visit #:    Referral Diagnosis: Calcific tendinitis of right shoulder  Referring provider: Jake Ruiz MD  Visit Diagnosis:     ICD-10-CM    1. Acute pain of right shoulder M25.511    2. Chronic pain of left knee M25.562     G89.29    3. Decreased ROM of left knee M25.662    4. Muscle weakness (generalized) M62.81          Assessment:     Patient is appropriate to continue with skilled physical therapy intervention, as indicated by initial plan of care.   Despite extremely poor HEP understanding and compliance due to dementia, patient is demonstrating an improved tolerance for active movement of the R shoulder.  Reports decreased L hip and knee pain post treatment session.    Goal Status:  Pt. will demonstrate/verbalize independence in self-management of condition in : 6 weeks  Pt. will be independent with home exercise program in : 6 weeks  Patient will reach / maintain arm movement: overhead;behind;for home chores;for dressing;with full ROM;with less pain;with less difficulty;in 6 weeks    Plan / Patient Education:     Continue manual per below to stretch R shoulder posterior capsule and decrease anterior shoulder pain.  Plan to continue to progress shoulder ER strength as tolerated for improved GH stability.  Continue ex as outlined within flowsheets, progressing as able, for L knee pain/OA.    Subjective:     Pain Rating: Moderate  Reports L hip and knee to be very bothersome.  Still have times when I strain the R shoulder and it produces sharp, shooting pain.    Objective:     Mild quad lag noted on L LE with active SLR and SAQ.  Knee ROM:    Date:       Knee ROM ( ) AROM in degrees AROM in degrees AROM in degrees    Right Left Right Left Right Left   Knee Flexion (0-130 )  96       Knee extension (0 )          PROM in degrees PROM in degrees PROM in degrees    Right Left Right Left Right Left    Knee Flexion (0-130 )  102       Knee extension (0 )             Treatment Today     TREATMENT MINUTES COMMENTS   Evaluation     Self-care/ Home management     Manual therapy     Neuromuscular Re-education     Therapeutic Activity     Therapeutic Exercises 28 See exercise flow sheet   Gait training     Modality__________________                Total 28    Blank areas are intentional and mean the treatment did not include these items.       Shoaib Acosta  7/14/2017

## 2021-06-11 NOTE — PROGRESS NOTES
Saira states that her sister has applied for help with her excel bill in the past. She has brought the bill to her sister stating she was confused. Per the notes an application for energy assistance was not worked on in our office. Care guide did contact community action and [employer] however both state they have nothing on file for Pat at this time. Saira states that she brought Pat to the [facility] to drop of papers last week. She will check with her to see if this was one she dropped off or if they will need to work on these.

## 2021-06-11 NOTE — PROGRESS NOTES
"[facility] Daily Progress     Patient Name: Ginger Lewis  Date: 7/7/2017  Visit #: 6/12   PTA visit #:    Referral Diagnosis: Calcific tendinitis of right shoulder  Referring provider: Jake Ruiz MD  Visit Diagnosis:     ICD-10-CM    1. Acute pain of right shoulder M25.511    2. Chronic pain of left knee M25.562     G89.29    3. Decreased ROM of left knee M25.662    4. Muscle weakness (generalized) M62.81          Assessment:     Patient is appropriate to continue with skilled physical therapy intervention, as indicated by initial plan of care.   Despite poor HEP compliance, patient is demonstrating an improved tolerance for active movement of the R shoulder, although with continued significant loss of AROM.  Reports decreased R shoulder pain and discomfort post therapy session today.    Goal Status:  Pt. will demonstrate/verbalize independence in self-management of condition in : 6 weeks  Pt. will be independent with home exercise program in : 6 weeks  Patient will reach / maintain arm movement: overhead;behind;for home chores;for dressing;with full ROM;with less pain;with less difficulty;in 6 weeks    Plan / Patient Education:     Continue manual per below to stretch R shoulder posterior capsule and decrease anterior shoulder pain.  Plan to continue to progress shoulder ER strength as tolerated for improved GH stability.    Subjective:     Pain Rating: \"Not much pain today.\"  Accidentally walked about 5 miles yesterday so the left hip and knee are really hurting. Probably arthritis says the doctor.  Pt reports she still keeps having times of sharp pain, with catching, feeling like the shoulder gets locked up. Often when reaching behind her.    Objective:     EXTENSIVE review of precautions/contraindications reviewed with the patient, as she continues to be unable to correctly verbalize self-care and management of condition. Emphasis placed on avoiding reaching behind her, pushing open " doors, and pulling open doors and to DO her HEP, most specifically the TB resistance exercise to build posterior rotator cuff and scapular stabilizer strength and endurance to maintain humerus in the proper position.    Treatment Today     TREATMENT MINUTES COMMENTS   Evaluation     Self-care/ Home management     Manual therapy 5 Supine: R GH joint posterior glides grade II to stretch posterior capsule   Neuromuscular Re-education     Therapeutic Activity     Therapeutic Exercises 20 See exercise flow sheet   Gait training     Modality__________________                Total 25    Blank areas are intentional and mean the treatment did not include these items.       Shoaib Acosta  7/7/2017

## 2021-06-11 NOTE — PROGRESS NOTES
Pt seems to be doing well with goals, gets a lot of help from their sister Saira with transportation, keeping up with appts, etc.  Pt said they have been trying a new medicine, more holistic med called Jay, which they say has been helping them feel better and more energetic.  Pt expressed that they weren't satisfied with PCP's last couple of Rx's, and that they don't trust PCP for future Rx's. CG will bring this up at [facility], and POD.  CG said they would call pt in a month or so, pt requested that CG call them in the afternoon

## 2021-06-11 NOTE — PROGRESS NOTES
"[facility] Daily Progress     Patient Name: Ginger Lewis  Date: 2017  Visit #: 3/12 through  (Medicare certification)  PTA visit #:    Referral Diagnosis: Calcific tendinitis of right shoulder  Referring provider: Jake Ruiz MD  Visit Diagnosis:     ICD-10-CM    1. Acute pain of right shoulder M25.511    2. Chronic pain of left knee M25.562     G89.29    3. Decreased ROM of left knee M25.662    4. Muscle weakness (generalized) M62.81          Assessment:     Patient is appropriate to continue with skilled physical therapy intervention, as indicated by initial plan of care.   Poor compliance with HEP as patient did not recall being given any exercises. Good tolerance to exercises after reviewing them without no c/o pain throughout. Good tolerance to manual therapy, though patient reports some mild soreness afterwards. Pt instructed in use of ice for pain management as needed     Goal Status:  Pt. will demonstrate/verbalize independence in self-management of condition in : 4 weeks  Pt. will be independent with home exercise program in : 4 weeks  Patient will reach / maintain arm movement: overhead;behind;for home chores;with full ROM;with less pain;with less difficulty;in 6 weeks    Plan / Patient Education:     Review newly added ex.  Continue manual per below to stretch R shoulder posterior capsule and decrease anterior shoulder pain.  Plan to continue to progress shoulder ER strength as tolerated for improved GH stability.    Subjective:     Pain Ratin   The right shoulder is \"really bad\" pt reports it is very hard to use that arm. Tying reach in any direction with that arm is very painful. Shoulder pain is prevents her from falling asleep. Pt reports the tape started to itch the same night of her last appointment. She had to take it off and it left a rash on her skin for a couple of days.       Objective:     Right shoulder is significantly lower than the left as noted in " static standing; however, no step-down deformity observed.  R shoulder AROM with only mild loss throughout all motions as compared to the left  TTP anterior shoulder most notably over GH joint. TTP (R) subscapularis and (R) brachialis.      Pt appearing confused and not able to recall the exercises that were given at the last session. Reviewed pendulums, doorway anterior shoulder stretch, doorway ER isometric, and scapular retractions to HEP. Did not add new exercises today     Treatment Today     TREATMENT MINUTES COMMENTS   Evaluation     Self-care/ Home management     Manual therapy 10 STM to anterior shoulder where TTP is present.  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   Neuromuscular Re-education     Therapeutic Activity     Therapeutic Exercises 15 See exercise flow sheet   Gait training     Modality__________________                Total 25    Blank areas are intentional and mean the treatment did not include these items.       Chrissy Hu  6/2/2017

## 2021-06-11 NOTE — PROGRESS NOTES
[facility] Re-Certification Request    June 9, 2017      Patient: Ginger Lewis  MR Number: [id number]  YOB: 1945  Date of Visit: 6/9/2017      Dear Dr. Ruiz:    As you may recall, we have been seeing Ginger Lewis for Physical Therapy of right shoulder pain and left knee pain.    For therapy to continue, Medicare and/or Medicaid requires periodic physician review of the treatment plan. Please review the summary of the patient's progress and our plan for continued therapy, and verify  that you agree therapy should continue by entering certification dates at the bottom of this note and co-signing this note.    Plan of Care  Authorization / Certification Start Date: 06/09/17  Authorization / Certification End Date: 09/07/17  Authorization / Certification Number of Visits: 10  Communication with: Referral Source  Patient Related Instruction: Nature of Condition;Treatment plan and rationale;Self Care instruction;Basis of treatment;Posture;Precautions;Next steps;Expected outcome  Times per Week: 1-2  Number of Weeks: 4-6  Number of Visits: up to 10  Therapeutic Exercise: ROM;Stretching;Strengthening  Neuromuscular Reeducation: kinesio tape;posture;balance/proprioception  Manual Therapy: soft tissue mobilization;myofascial release;joint mobilization  Modalities: TENS;ultrasound;cold pack;hot pack  Equipment: theraband  Carrying, Moving and Handling Objects Goal Status (): CI    Goal  Pt. will demonstrate/verbalize independence in self-management of condition in : 6 weeks  Pt. will be independent with home exercise program in : 6 weeks  Patient will reach / maintain arm movement: overhead;behind;for home chores;for dressing;with full ROM;with less pain;with less difficulty;in 6 weeks  No Data Recorded      If you have any questions or concerns, please don't hesitate to call.    Sincerely,      Chrissy Hu, PT        Physician recommendation:     ___ Follow therapist's  recommendation        ___ Modify therapy      *Physician co-signature indicates they certify the need for these services furnished within this plan and while under their care.        [facility] Daily Progress     Patient Name: Ginger Lewis  Date: 2017  Visit #:    PTA visit #:    Referral Diagnosis: Calcific tendinitis of right shoulder  Referring provider: Jake Ruiz MD  Visit Diagnosis:     ICD-10-CM    1. Acute pain of right shoulder M25.511    2. Chronic pain of left knee M25.562     G89.29    3. Decreased ROM of left knee M25.662    4. Muscle weakness (generalized) M62.81          Assessment:     Patient is appropriate to continue with skilled physical therapy intervention, as indicated by initial plan of care.   Despite poor HEP compliance, patient is demonstrating an improved tolerance for active movement of the R shoulder, although with continued significant loss of AROM.    Goal Status:  Pt. will demonstrate/verbalize independence in self-management of condition in : 6 weeks  Pt. will be independent with home exercise program in : 6 weeks  Patient will reach / maintain arm movement: overhead;behind;for home chores;for dressing;with full ROM;with less pain;with less difficulty;in 6 weeks    Plan / Patient Education:     Review newly added ex.  Continue manual per below to stretch R shoulder posterior capsule and decrease anterior shoulder pain.  Plan to continue to progress shoulder ER strength as tolerated for improved GH stability.    Subjective:     Pain Ratin   Pain at its worst is a 5/10.    Pt reports she does feel like the shoulder is getting better. Very slowly, but it is improving. Pt reports last night was the first night she did not have trouble falling asleep because of pain and did not wake up during the night.       Objective:   (R) shoulder AROM   flexion: 135, anterior shoulder pain returning to 0   abduction: 144 no pain   ER: 50 (seated), stiff no  pain   IR: thumb to T8, pain returning to starting position    Consistent tactile and verbal cueing required for correct performance of HEP  Added TB rows with YTB to HEP    Treatment Today     TREATMENT MINUTES COMMENTS   Evaluation     Self-care/ Home management     Manual therapy 6 STM to anterior shoulder where TTP is present.     Neuromuscular Re-education     Therapeutic Activity     Therapeutic Exercises 20 See exercise flow sheet   Gait training     Modality__________________                Total 26    Blank areas are intentional and mean the treatment did not include these items.       Chrissy Hu  6/9/2017

## 2021-06-11 NOTE — PROGRESS NOTES
Attempt 1: Care Guide called patient.  If this patient is returning my call, please transfer to [facility] at ext [phone].

## 2021-06-11 NOTE — PROGRESS NOTES
[facility] Daily Progress     Patient Name: Ginger Lewis  Date: 7/10/2017  Visit #: 7/12   PTA visit #:    Referral Diagnosis: Calcific tendinitis of right shoulder  Referring provider: Jake Ruiz MD  Visit Diagnosis:     ICD-10-CM    1. Acute pain of right shoulder M25.511    2. Chronic pain of left knee M25.562     G89.29    3. Decreased ROM of left knee M25.662    4. Muscle weakness (generalized) M62.81          Assessment:     Patient is appropriate to continue with skilled physical therapy intervention, as indicated by initial plan of care.   Despite poor HEP compliance, patient is demonstrating an improved tolerance for active movement of the R shoulder, although with continued significant loss of AROM.  Reports decreased R shoulder pain and discomfort post therapy session today.    Goal Status:  Pt. will demonstrate/verbalize independence in self-management of condition in : 6 weeks  Pt. will be independent with home exercise program in : 6 weeks  Patient will reach / maintain arm movement: overhead;behind;for home chores;for dressing;with full ROM;with less pain;with less difficulty;in 6 weeks    Plan / Patient Education:     Continue manual per below to stretch R shoulder posterior capsule and decrease anterior shoulder pain.  Plan to continue to progress shoulder ER strength as tolerated for improved GH stability.  Follow-up on status of pool workouts.    Subjective:     Pain Rating: Moderate  R Shoulder is pretty sore today, but the hip and knee are feeling much better. Not sure if I slept wrong or from putting shirt on overhead.    Objective:     Reviewed precautions regarding R shoulder instability.  Poor exercise handout provided and discussed. Encouraged to perform forward/backwards/lateral ambulation along the edge of the pool, and perform remainder exercises at the edge of the pool for safety. Performed 3-4 reps of each exercise on land with handrail A for improved patient  comprehension.    Treatment Today     TREATMENT MINUTES COMMENTS   Evaluation     Self-care/ Home management     Manual therapy 9 Supine: R GH joint posterior glides grade II to stretch posterior capsule   Neuromuscular Re-education     Therapeutic Activity     Therapeutic Exercises 17 See exercise flow sheet   Gait training     Modality__________________                Total 26    Blank areas are intentional and mean the treatment did not include these items.       Shoaib Acosta  7/10/2017

## 2021-06-11 NOTE — PROGRESS NOTES
[facility] Daily Progress     Patient Name: Ginger Lewis  Date: 2017  Visit #:    PTA visit #:    Referral Diagnosis: Calcific tendinitis of right shoulder  Referring provider: Jake Ruiz MD  Visit Diagnosis:     ICD-10-CM    1. Acute pain of right shoulder M25.511    2. Chronic pain of left knee M25.562     G89.29    3. Decreased ROM of left knee M25.662    4. Muscle weakness (generalized) M62.81          Assessment:     Patient is appropriate to continue with skilled physical therapy intervention, as indicated by initial plan of care.   Despite poor HEP compliance, patient is demonstrating an improved tolerance for active movement of the R shoulder, although with continued significant loss of AROM.\  Reports decreased R shoulder pain and discomfort post therapy session today.    Goal Status:  Pt. will demonstrate/verbalize independence in self-management of condition in : 6 weeks  Pt. will be independent with home exercise program in : 6 weeks  Patient will reach / maintain arm movement: overhead;behind;for home chores;for dressing;with full ROM;with less pain;with less difficulty;in 6 weeks    Plan / Patient Education:     Review newly added ex.  Continue manual per below to stretch R shoulder posterior capsule and decrease anterior shoulder pain.  Plan to continue to progress shoulder ER strength as tolerated for improved GH stability.    Subjective:     Pain Ratin-8/10  R shoulder had been doing pretty good up until last night. Just could not get comfortable or get to sleep, so pretty tired today.  L knee is continuing to bother her, due to arthritis.    Objective:     Consistent tactile and verbal cueing required for correct performance of HEP  Knee ROM:    Date:       Knee ROM ( ) AROM in degrees AROM in degrees AROM in degrees    Right Left Right Left Right Left   Knee Flexion (0-130 ) 120 88 with end-range discomfort       Knee extension (0 )          PROM in degrees  PROM in degrees PROM in degrees    Right Left Right Left Right Left   Knee Flexion (0-130 )         Knee extension (0 )             Treatment Today     TREATMENT MINUTES COMMENTS   Evaluation     Self-care/ Home management     Manual therapy     Neuromuscular Re-education     Therapeutic Activity     Therapeutic Exercises 40 See exercise flow sheet   Gait training     Modality__________________                Total 40    Blank areas are intentional and mean the treatment did not include these items.       Shoaib Acosta  6/13/2017

## 2021-06-12 NOTE — PROGRESS NOTES
[facility] Daily Progress     Patient Name: Ginger Lewis  Date: 8/30/2017  Visit #: 13/16 through 11/21/17  PTA visit #:    Referral Diagnosis: Calcific tendinitis of right shoulder  Referring provider: Jake Ruiz MD  Visit Diagnosis:     ICD-10-CM    1. Acute pain of right shoulder M25.511    2. Chronic pain of left knee M25.562     G89.29    3. Decreased ROM of left knee M25.662    4. Muscle weakness (generalized) M62.81          Assessment:     Patient is benefitting from skilled physical therapy and is making steady progress toward functional goals.  Patient is appropriate to continue with skilled physical therapy intervention, as indicated by initial plan of care.   Despite extremely poor HEP understanding and compliance due to dementia, patient is demonstrating improved pain levels and an improved tolerance for active/passive movement of the R shoulder.  Reports decreased L knee pain and stiffness sensation with use of stationary bike.    Goal Status: ALL PROGRESSING  Pt. will demonstrate/verbalize independence in self-management of condition in : 6 weeks  Pt. will be independent with home exercise program in : 6 weeks  Patient will reach / maintain arm movement: overhead;behind;for home chores;for dressing;with full ROM;with less pain;with less difficulty;in 6 weeks    Plan / Patient Education:     Considering patient's poorer cognitive functioning, plan to follow-up more frequent in the near future in hopes of greater ex retention: 2x/week for 1 additional week and reassess independence with HEP.    Subjective:     Pain Rating: Mild/moderate  Left knee still hurts.  L big toe nail is coming off.    Objective:     Continued significant verbal and tactile cueing required for correct performance of ex.  L knee flexion PROM: 96*.    Treatment Today     TREATMENT MINUTES COMMENTS   Evaluation     Self-care/ Home management     Manual therapy     Neuromuscular Re-education     Therapeutic  Activity     Therapeutic Exercises 36 -Ex per flow sheet   Gait training     Modality__________________                Total 36    Blank areas are intentional and mean the treatment did not include these items.       Shoaib Acosta  8/30/2017

## 2021-06-12 NOTE — PROGRESS NOTES
"[facility] Daily Progress     Patient Name: Ginger Lewis  Date: 9/8/2017  Visit #: 14/16 through 11/21/17  PTA visit #:    Referral Diagnosis: Calcific tendinitis of right shoulder  Referring provider: Jake Ruiz MD  Visit Diagnosis:     ICD-10-CM    1. Acute pain of right shoulder M25.511    2. Chronic pain of left knee M25.562     G89.29    3. Decreased ROM of left knee M25.662    4. Muscle weakness (generalized) M62.81          Assessment:     Despite extremely poor HEP understanding and compliance due to dementia, patient is demonstrating significantly improved pain, ROM, and functional strength of the R UE from start of care.     Goal Status:  Pt. will demonstrate/verbalize independence in self-management of condition in : 6 weeks. PROGRESSING  Pt. will be independent with home exercise program in : 6 weeks. PROGRESSING  Patient will reach / maintain arm movement: overhead;behind;for home chores;for dressing;with full ROM;with less pain;with less difficulty;in 6 weeks. MET    Plan / Patient Education:     Pt to transition to independent self-management via HEP at this time.  Agreeable to hold chart x2-3 weeks for remainder follow-up prn.    Subjective:     Pain Rating: Mild/moderate intermittent.  \"I think this can be my last day. I am feeling really good.\"  Still careful with lifting and pushing open a heavy door. Able to move my arm in any direction now.  Sleep is much better over the last 1-2 weeks. Even able to lie a bit on the R side now.  Denies pain medication use.    Objective:     SPADI: 13%  Shoulder/Elbow ROM  Date:      Shoulder and Elbow ROM ( )   AROM in degrees AROM in degrees AROM in degrees    Right Left Right Left Right Left   Shoulder Flexion (0-180 ) WFL WFL       Shoulder Abduction (0-180 ) WFL WFL       Shoulder Extension (0-60 )         Shoulder ER (0-90 ) HAND TO UPPER THORACIC SPINE HAND TO UPPER THORACIC SPINE       Shoulder IR (0-70 ) THUMB TO T7 WITH MILD " DISCOMFORT THUMB TO T7       Shoulder IR/EXT         Elbow Flexion (150 )         Elbow Extension (0 )          PROM in degrees PROM in degrees PROM in degrees    Right Left Right Left Right Left   Shoulder Flexion (0-180 )         Shoulder Abduction (0-180 )         Shoulder Extension (0-60 )         Shoulder ER (0-90 )         Shoulder IR (0-70 )         Elbow Flexion (150 )         Elbow Extension (0 )             Shoulder/Elbow Strength  Date:      Shoulder/Elbow Strength (/5)  Manual Muscle Test (MMT) MMT MMT MMT    Right Left Right Left Right Left   Shoulder Flexion 4+ 4+       Supraspinatus         Shoulder Abduction 4+ 4+       Shoulder Extension         Shoulder External Rotation 4+ 4+       Shoulder Internal Rotation 5 5       Elbow Flexion 4+ 4+       Elbow Extension 4+ 4+       Other:         Other:               Treatment Today     TREATMENT MINUTES COMMENTS   Evaluation     Self-care/ Home management     Manual therapy     Neuromuscular Re-education     Therapeutic Activity     Therapeutic Exercises 25 -Outcomes assessment  -Ex per flow sheet   Gait training     Modality__________________                Total 25    Blank areas are intentional and mean the treatment did not include these items.       Shoaib Dave  9/8/2017    [facility] Discharge Summary  Patient Name: Ginger Lewis  Date: 10/5/2017  Referral Diagnosis: Calcific tendinitis of right shoulder  Referring provider: Jake Ruiz MD  Visit Diagnosis:   1. Acute pain of right shoulder     2. Chronic pain of left knee     3. Decreased ROM of left knee     4. Muscle weakness (generalized)         Goals:  Pt. will demonstrate/verbalize independence in self-management of condition in : 6 weeks. PROGRESSING  Pt. will be independent with home exercise program in : 6 weeks. PROGRESSING  Patient will reach / maintain arm movement: overhead;behind;for home chores;for dressing;with full ROM;with less pain;with less  difficulty;in 6 weeks. MET    Patient was seen for 14 visits from 03/14/17 to 09/08/17.  Please see above assessment and plan. Pt has not called to schedule any further follow-up.    Therapy will be discontinued at this time.  The patient will need a new referral to resume.    Thank you for your referral.  Shoaib Acosta  10/5/2017  10:52 AM

## 2021-06-12 NOTE — PROGRESS NOTES
"[facility] Daily Progress     Patient Name: Ginger Lewis  Date: 7/27/2017  Visit #: 10/12   PTA visit #:    Referral Diagnosis: Calcific tendinitis of right shoulder  Referring provider: Jake Ruiz MD  Visit Diagnosis:     ICD-10-CM    1. Acute pain of right shoulder M25.511    2. Chronic pain of left knee M25.562     G89.29    3. Decreased ROM of left knee M25.662    4. Muscle weakness (generalized) M62.81          Assessment:     Patient is benefitting from skilled physical therapy and is making steady progress toward functional goals.  Patient is appropriate to continue with skilled physical therapy intervention, as indicated by initial plan of care.   Despite extremely poor HEP understanding and compliance due to dementia, patient is demonstrating an improved tolerance for active/passive movement of the R shoulder.    Goal Status:  Pt. will demonstrate/verbalize independence in self-management of condition in : 6 weeks  Pt. will be independent with home exercise program in : 6 weeks  Patient will reach / maintain arm movement: overhead;behind;for home chores;for dressing;with full ROM;with less pain;with less difficulty;in 6 weeks    Plan / Patient Education:     Continue manual per below to stretch R shoulder posterior capsule and decrease anterior shoulder pain.  Plan to continue to progress shoulder ER strength as tolerated for improved GH stability.  Continue ex as outlined within flowsheets, progressing as able, for L knee pain/OA.    Subjective:     Pain Rating: Moderate  No longer with sharp, shooting pain, but with more intense dull ache over the past few days.  Taking two, extra-strength Tylenol before bed, but still with a lot of difficulty sleeping at night. Just cannot get comfortable.  No soreness after last session.  \"I just don't know if it is getting any better.\"    Objective:     Pt arrived 7 min late.  On review of ex and ice, \"Oh, I am not doing that.\" Encouraged " regular ice use, 15-20 min, 3x/day, especially before bed.    Treatment Today     TREATMENT MINUTES COMMENTS   Evaluation     Self-care/ Home management     Manual therapy 5 -Supine R GH joint posterior glides grade II>III 3x30 reps to stretch posterior capsule, decrease pain, improve ROM   Neuromuscular Re-education     Therapeutic Activity     Therapeutic Exercises 19 See exercise flow sheet   Gait training     Modality__________________                Total 24    Blank areas are intentional and mean the treatment did not include these items.       Shoaib Acosta  7/27/2017

## 2021-06-12 NOTE — PROGRESS NOTES
[facility] Re-Certification Request    August 23, 2017      Patient: Ginger Lewis  MR Number: [id number]  YOB: 1945  Date of Visit: 8/23/2017      Dear Dr. Jake Ruiz:    As you may recall, we have been seeing Ginger Lewis in Physical Therapy for right shoulder pain/instability and L knee pain/OA.    For therapy to continue, Medicare and/or Medicaid requires periodic physician review of the treatment plan. Please review the summary of the patient's progress and our plan for continued therapy, and verify  that you agree therapy should continue by entering certification dates at the bottom of this note and co-signing this note.    Plan of Care  Authorization / Certification Start Date: 08/23/17  Authorization / Certification End Date: 11/21/17  Authorization / Certification Number of Visits: 5 additional (up to 16 total)  Communication with: Referral Source  Patient Related Instruction: Nature of Condition;Treatment plan and rationale;Self Care instruction;Basis of treatment;Posture;Precautions;Next steps;Expected outcome  Times per Week: 2  Number of Weeks: 2-3  Number of Visits: 5 additional (16 total)  Therapeutic Exercise: ROM;Stretching;Strengthening  Neuromuscular Reeducation: posture;balance/proprioception  Manual Therapy: soft tissue mobilization;myofascial release;joint mobilization  Equipment: theraband  Carrying, Moving and Handling Objects Goal Status (): CI    Goals: ALL PROGRESSING  Pt. will demonstrate/verbalize independence in self-management of condition in : 6 weeks  Pt. will be independent with home exercise program in : 6 weeks  Patient will reach / maintain arm movement: overhead;behind;for home chores;for dressing;with full ROM;with less pain;with less difficulty;in 6 weeks      If you have any questions or concerns, please don't hesitate to call.    Sincerely,      Shoaib Acosta, PT        Physician recommendation:     ___ Follow therapist's  "recommendation        ___ Modify therapy      *Physician co-signature indicates they certify the need for these services furnished within this plan and while under their care.        [facility] Daily Progress     Patient Name: Ginger Lewis  Date: 2017  Visit #:    PTA visit #:    Referral Diagnosis: Calcific tendinitis of right shoulder  Referring provider: Jake Ruiz MD  Visit Diagnosis:     ICD-10-CM    1. Acute pain of right shoulder M25.511    2. Chronic pain of left knee M25.562     G89.29    3. Decreased ROM of left knee M25.662    4. Muscle weakness (generalized) M62.81          Assessment:     Patient is benefitting from skilled physical therapy and is making steady progress toward functional goals.  Patient is appropriate to continue with skilled physical therapy intervention, as indicated by initial plan of care.   Despite extremely poor HEP understanding and compliance due to dementia, patient is demonstrating improved pain levels and an improved tolerance for active/passive movement of the R shoulder.    Goal Status: ALL PROGRESSING  Pt. will demonstrate/verbalize independence in self-management of condition in : 6 weeks  Pt. will be independent with home exercise program in : 6 weeks  Patient will reach / maintain arm movement: overhead;behind;for home chores;for dressing;with full ROM;with less pain;with less difficulty;in 6 weeks    Plan / Patient Education:     Considering patient's poorer cognitive functioning, plan to follow-up more frequent in the near future in hopes of greater ex retention: 2x/week for 2 weeks and reassess independence with HEP.    Subjective:     Pain Ratin/10 currently. 6/10 at worst, 2/10 at best.  Front of the R shoulder still bothers me. Very careful with it. Still with difficulty pulling and pushing open the heavy doors in her building, needing to use the L UE. Sleep is still very bad- that is where I have my worst trouble.\" Using a " "pillow for support and lying on the L side. Not waking as frequently due to pain, but still not able to lie on the R side or stomach (per prior level) at night.  Able to bathe and dress independently, but still with difficulty with overhead shirts. Needs help with cleaning, specifically vacuuming.  Regarding L knee, \"that is doing pretty good. Have done a lot of walking. It doesn't stop me from doing anything, sometimes it just feels stiff.\"    Objective:     SPADI: Not Assessed due to cognitive impairments.  Shoulder/Elbow ROM  Date:      Shoulder and Elbow ROM ( )   AROM in degrees AROM in degrees AROM in degrees    Right Left Right Left Right Left   Shoulder Flexion (0-180 ) 140 140       Shoulder Abduction (0-180 ) 140 140       Shoulder Extension (0-60 ) 35 and PF 60       Shoulder ABD/ER (0-90 ) Thumb to C7 and PF Thumb to T2       Shoulder IR (0-70 )         Shoulder IR/EXT Thumb to T9 and PF Thumb to T7       Elbow Flexion (150 )         Elbow Extension (0 )          PROM in degrees PROM in degrees PROM in degrees    Right Left Right Left Right Left   Shoulder Flexion (0-180 ) 162        Shoulder Abduction (0-180 ) 145 with ERP        Shoulder Extension (0-60 )         Shoulder ER (0-90 ) 60 and PF        Shoulder IR (0-70 ) 70        Elbow Flexion (150 )         Elbow Extension (0 )             Shoulder/Elbow Strength  Date:      Shoulder/Elbow Strength (/5)  Manual Muscle Test (MMT) MMT MMT MMT    Right Left Right Left Right Left   Shoulder Flexion 4+ 4+       Supraspinatus         Shoulder Abduction 4+ 4+       Shoulder Extension         Shoulder External Rotation 4+ 4+       Shoulder Internal Rotation 4+ 4+       Elbow Flexion 5 5       Elbow Extension 5 5       Other:         Other:               Treatment Today     TREATMENT MINUTES COMMENTS   Evaluation     Self-care/ Home management     Manual therapy 5 -Supine R GH joint posterior glides grade II>III 3x30 reps to stretch posterior capsule, decrease " pain, improve ROM   Neuromuscular Re-education     Therapeutic Activity     Therapeutic Exercises 40 -Ex per flow sheet: SIGNIFICANT TIME SPENT REVIEWING WITH SIGNIFICANT CUEING FOR CORRECT PERFORMANCE  -Outcomes assessment, discussion of progress, POC   Gait training     Modality__________________                Total 45    Blank areas are intentional and mean the treatment did not include these items.       Shoaib Acosta  8/23/2017

## 2021-06-12 NOTE — PROGRESS NOTES
[facility] Daily Progress     Patient Name: Ginger Leiws  Date: 8/28/2017  Visit #: 12/16 through 11/21/17  PTA visit #:    Referral Diagnosis: Calcific tendinitis of right shoulder  Referring provider: Jake Ruiz MD  Visit Diagnosis:     ICD-10-CM    1. Acute pain of right shoulder M25.511    2. Chronic pain of left knee M25.562     G89.29    3. Decreased ROM of left knee M25.662    4. Muscle weakness (generalized) M62.81          Assessment:     Patient is benefitting from skilled physical therapy and is making steady progress toward functional goals.  Patient is appropriate to continue with skilled physical therapy intervention, as indicated by initial plan of care.   Despite extremely poor HEP understanding and compliance due to dementia, patient is demonstrating improved pain levels and an improved tolerance for active/passive movement of the R shoulder.    Goal Status: ALL PROGRESSING  Pt. will demonstrate/verbalize independence in self-management of condition in : 6 weeks  Pt. will be independent with home exercise program in : 6 weeks  Patient will reach / maintain arm movement: overhead;behind;for home chores;for dressing;with full ROM;with less pain;with less difficulty;in 6 weeks    Plan / Patient Education:     Considering patient's poorer cognitive functioning, plan to follow-up more frequent in the near future in hopes of greater ex retention: 2x/week for 2 weeks and reassess independence with HEP.    Subjective:     Pain Rating: Moderate  R shoulder is more painful today, must have slept on it awkward last night. Woke up about an hour ago.  L knee is bothersome as well today. Not sure why. Trying to get up and walk frequently through the day, but maybe have been sitting more recently.    Objective:     Continued significant verbal and tactile cueing required for correct performance of ex.    Treatment Today     TREATMENT MINUTES COMMENTS   Evaluation     Self-care/ Home  management     Manual therapy     Neuromuscular Re-education     Therapeutic Activity     Therapeutic Exercises 26 -Ex per flow sheet   Gait training     Modality__________________                Total 26    Blank areas are intentional and mean the treatment did not include these items.       Shoaib Acosta  8/28/2017

## 2021-06-12 NOTE — PROGRESS NOTES
[facility] Daily Progress     Patient Name: Ginger Lewis  Date: 7/24/2017  Visit #: 9/12   PTA visit #:    Referral Diagnosis: Calcific tendinitis of right shoulder  Referring provider: Jake Ruiz MD  Visit Diagnosis:     ICD-10-CM    1. Acute pain of right shoulder M25.511    2. Chronic pain of left knee M25.562     G89.29    3. Decreased ROM of left knee M25.662    4. Muscle weakness (generalized) M62.81          Assessment:     Patient is benefitting from skilled physical therapy and is making steady progress toward functional goals.  Patient is appropriate to continue with skilled physical therapy intervention, as indicated by initial plan of care.   Despite extremely poor HEP understanding and compliance due to dementia, patient is demonstrating an improved tolerance for active/passive movement of the R shoulder.    Goal Status:  Pt. will demonstrate/verbalize independence in self-management of condition in : 6 weeks  Pt. will be independent with home exercise program in : 6 weeks  Patient will reach / maintain arm movement: overhead;behind;for home chores;for dressing;with full ROM;with less pain;with less difficulty;in 6 weeks    Plan / Patient Education:     Continue manual per below to stretch R shoulder posterior capsule and decrease anterior shoulder pain.  Plan to continue to progress shoulder ER strength as tolerated for improved GH stability.  Continue ex as outlined within flowsheets, progressing as able, for L knee pain/OA.    Subjective:     Pain Rating: Moderate  R shoulder still bothersome, although not having as many times or sharp, stabbing pain.  Sleep is terrible- still cannot get comfortable despite correct pillow positioning for comfort.  Sister watches to make sure I do not pull or push open the heavy door with R arm.    Objective:     Pt continues to require cueing to perform EXTERNAL, versus internal, shoulder rotation with TB.  Progressed to OTB for rows, pull  downs.  Added lateral wall walking with YTB to HEP.    Treatment Today     TREATMENT MINUTES COMMENTS   Evaluation     Self-care/ Home management     Manual therapy 5 -Supine R GH joint posterior glides grade II>III 3x30 reps to stretch posterior capsule, decrease pain, improve ROM   Neuromuscular Re-education     Therapeutic Activity     Therapeutic Exercises 22 See exercise flow sheet   Gait training     Modality__________________                Total 27    Blank areas are intentional and mean the treatment did not include these items.       Shoaib Acosta  7/24/2017

## 2021-06-13 NOTE — PROGRESS NOTES
Assessment / Impression   1.  Dementia, dementia the Alzheimer type, rule out hybrid BRY/DLB given patient's persistent delirium  2.  Depression  3.  Delirium, persistent      Plan:   1.  Occupational therapy assessment  2.  Social service consultation to execute healthcare power    Long conversation held with the patient and sister Saira in attendance.  I reviewed screening blood tests, neuro imaging and results of neuropsychometric testing.  This patient with a cortical neurodegenerative process likely is also suffering from a chronic delirium that became clinically manifest following surgery earlier this year.  At this point in time, given the patient's tendency to be medication noncompliant, I am not willing to begin medication therapies however treatment for depression and Alzheimer's disease will be required in the future.    Total time for this evaluation 45 minutes with majority time spent in counseling the patient and daughter.  All questions answered.      Subjective:     HPI: Ginger Lewis is a 72 y.o. female with above-noted diagnoses who returns for follow-up.  The patient on neuropsychometric testing demonstrates a dementia level of impairment with multiple cognitive domains affected including new learning.  The pattern of deficits is most consistent with an Alzheimer process but I do note some prefrontal impairments as well.  At this point in time significant depression is noted as well as a significant need for assistance within the home.  The patient has demonstrated reluctance to follow through with treatments and I believe she is largely noncompliant with therapies at this time.    The patient today demonstrates intermittent brief insight into her current situation.  Thought processes are noted to be quite concrete.  She demonstrates significant difficulty with short-term recall.  She does admit to symptoms of depression at this time referring to rather intense feelings of sadness and  abandonment and loneliness.          Review of Systems:          As above        Objective:     Vitals:    10/25/17 1311 10/25/17 1312 10/25/17 1313   BP: (!) 183/89 (!) 187/91 (!) 186/88   Pulse: 72 73 80   Weight: 199 lb (90.3 kg)         No results found for this or any previous visit (from the past 24 hour(s)).    Physical Exam: As noted previously.  The patient is slightly disheveled in appearance but seated for evaluation.  She is alert and generally cooperative.  Clear evidence of impairment in short-term recall noted.  The patient also is having trouble with insight and reasoning abilities although insight is at least partially and intermittently preserved.  Affect is depressed and mood congruent

## 2021-06-13 NOTE — PROGRESS NOTES
Spoke with Saira regarding pt's new alzheimer dx. Saira is concerned with her sister's [employer] bill. Melania had previously handled this for the patient. Saira stated Pat has not paid an energy bill in 1.5 years. There are no notes from Melania as to what she was doing. Pt was scheduled with  to figure out her [employer] and how to get a new phone. Pt cannot manage a smart phone at this time. SW will discuss with family/pt. Saira explained that Kayleigh and her son have had an estranged relationship but he offered to have her come out for the holidays. Saiar thinks this would be a good idea and will discuss travel with PCP. PCP would possibly need to approve a flight. CG and IM team worked together to get patient in sooner with PCP. Pt will see Dr. Ruiz on 11/8 along with SW and CG visits. CG left vm for Saira explaining new appt times for 11/8/17. CG suggested possibly having a PCA go into the home. Saira was concerned due to past experience of workers coming into the home. Pt has dismissed all and is very difficult to work with. CG will work with family to get some assistance in her new diagnosis.     Next Outreach: 11/7/17

## 2021-06-13 NOTE — PROGRESS NOTES
Attempt 3: Care Guide called patient.  If this patient is returning my call, please transfer to Gayle at ext [phone].  I have called this patient 3 times over the past two weeks and have been unsuccessful in reaching her.  This patient has also not returned any of my messages.  I will continue attempting to reach out to this patient in one month.  I will also check this patient's chart for upcoming appointments, ER reports that may contain a new phone number, or any other recent activity.    Next Outreach: 11/17/17

## 2021-06-13 NOTE — PROGRESS NOTES
Spoke to emergency contact Saira about appts. After receiving staff message from DEBBY, pt will need to see FRG as well. FRG will call Saira to coordinate appt on 11/8 to discuss energy assistance.

## 2021-06-13 NOTE — PROGRESS NOTES
Left message with new CG contact info. I also asked her to call back if anything is needed.    Next Outreach 10/18

## 2021-06-13 NOTE — PROGRESS NOTES
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      FMS 1         Raw Rating   GDS 14 Severe

## 2021-06-13 NOTE — PROGRESS NOTES
Persons accompanying you (the patient) today: sister raven    How have you been doing since we saw you last? Please note any concerns.  none    Please list any surgeries or procedures you have had since we saw you last:  none    Have you had any falls since your last visit? No    Do you have any pain today? Yes:  Pain right shoulder fell last year during first snow fall slipped and fell hurt shoulder    With whom do you currently reside? (alone, spouse, family, assisted living, nursing h

## 2021-06-13 NOTE — PROGRESS NOTES
11/07/17 Referral from [facility] CG and [facility] SW to meet with patient to assist with Energy Assistance application. Will call patient's sister to set up a time.     Called patient's sister to set up a time to assist with Energy Assistance Application, let her know that an appointment prior to Dr hill on 11/08 would work (12:30?). Will call back if no response.   *Patients sister called back did not want another apt on 11/08 as sister will be overwhelmed with too many, reports patient is not a morning person, scheduled apt for 11/13 in afternoon.     Met with patient on 11/13/18 will check with care guide to see if patient needs additional FRG assistance.

## 2021-06-13 NOTE — PROGRESS NOTES
Attempt 2: Care Guide called patient.  If this patient is returning my call, please transfer to [facility] at ext [phone].

## 2021-06-14 NOTE — PROGRESS NOTES
Spoke with patient to remind her of phone call with SW tomorrow 12/1. Pt states she will most likely be home and be available to take the call. SW will call the patient not her sister. Pt talked about her Chiropractor she saw yesterday. Pt says he helped her so much compared to the last Chiropractor she worked with. Pt was able to fall asleep very easily last night. Pt will follow up next week for second treatment.

## 2021-06-14 NOTE — PROGRESS NOTES
Met with patient very briefly. She was accompanied by her daughter Saira. Pt was feeling overwhelmed and just wanted to leave the clinic. CG brought pt back over to DEBBY Moya to finish up a few things. Pt will be contacted at later date for any delegations.    Received hand written note (written by sibling of pt) asking for approval to visit son in [place] for Thanksgiving. PCP okay'd this but I don't think is aware she would be traveling alone. Would solo travel be advisable given her diagnosis? CG will staff message PCP. CG will call sibling for discussion.

## 2021-06-14 NOTE — PROGRESS NOTES
MRN+[id number]:  [id number]    Patient name: Kayleigh Lewis    Care Guide: Gayle    Discuss at Care Conferences: Yes 12.6.17    Barriers: Pt frustrated with life.Continue to decline in home services. Pt does not want sister's help.    Plan Summary: updated PCP; keep following and assess safety with living on her own    Action  Due Date   [facility] RN will:  n/a n/a   Delegations: Action  Due Date   [facility] SW will:  n/a n/a   [facility] Care Guide will: continue standard outreach (one month)

## 2021-06-14 NOTE — PROGRESS NOTES
ASSESSMENT:  1. Chronic midline low back pain with left-sided sciatica  With questionably positive straight leg raise on the left.  Consider disc.  History disjointed and chaotic.  Trial of steroids and anti-inflammatory.  Already on gabapentin.  Seek rheumatology opinion also versus spine clinic  - Ambulatory referral to Rheumatology    2. Alzheimer's dementia  Reviewed neuropsych testing and geriatric note.  Per note, due to medicine noncompliance, not interested in beginning medication.  She has not taken the medicines I have initiated over the last year.  She does reports she is no longer driving and is cared for by her sister    I received a note from an unknown party wondering whether it is okay to take her to [place] for Thanksgiving with her son.  I acknowledge that that is fine with me    3. Visit for screening mammogram  She is not interested    4. Screen for colon cancer  She is not interested    5. Primary osteoarthritis involving multiple joints  Trial of anti-inflammatories    6. Trigeminal neuralgia  Continue gabapentin    7. Calcific tendinitis of right shoulder  Status post physical therapy.  Referral to rheumatology for repeat assessment and consider cortisone injection  - Ambulatory referral to Rheumatology      8.  Hypertension.  Stable    PLAN:  Patient Instructions   For the right shoulder, I recommend you see a rheumatologist to consider a cortisone injection for the pain    For the tailbone and the leg, this might be a disc or arthritis    Steroids for a week, prednisone 20 mgs daily for one week, in the morning    Naproxen 500 mgs twice a day with food for 2-3 weeks for inflammation.  That can help back, and the leg, maybe also the shoulder          Orders Placed This Encounter   Procedures     Ambulatory referral to Rheumatology     Referral Priority:   Routine     Referral Type:   Consultation     Referral Reason:   Evaluation and Treatment     Requested Specialty:   Rheumatology      Number of Visits Requested:   1     Medications Discontinued During This Encounter   Medication Reason     acetaminophen (TYLENOL) 325 MG tablet Alternate therapy       No Follow-up on file.    CHIEF COMPLAINT:  Chief Complaint   Patient presents with     Follow-up     discuss Last kapadia     Insomnia     pt states she is not sleeping well at all.     Arthritis     pt says that her arthritis in leg, hip, and back is getting worse. keeping her up at night.        HISTORY OF PRESENT ILLNESS:  Ginger is a 72 y.o. female presenting to the clinic today for a follow up. She was last seen here in the clinic in April.     Pain: She has a lot of pain in her left knee, left hip, and tailbone. She believes that the pain is related to arthritis. The pain seems to be moving up her spine, which concerns her. This has been getting gradually worse, and the last month has been especially bad. She purchased Tylenol Arthritis over the counter, which helped her back pain. She has not taken Advil or Aleve.     Shoulder Pain: She has pain in her right shoulder. Of note, she injured it after a fall last year. She thinks it was injured when getting helped up, not from hitting the ground. She is careful to avoid irritating it, but she has trouble getting comfortable during the night. Most of the pain is in the anterior shoulder. She is right handed, so house work has been difficult.     Insomnia: She has had a lot difficultly sleeping lately. Her shoulder and tailbone pain play a large part in this. She does not want to take a sleeping medication. She states she only slept for about an hour last night.     Trigeminal Neuralgia: She continues to take two tablets of gabapentin twice daily. She thinks it helps quite a few things, especially pain related to trigeminal neuralgia.     Alzheimer's: She was given a diagnosis of Alzheimer's by Dr. Landaverde. She was not started on any medications for this.     Health Maintenance: She does not want a  mammogram or a colonoscopy.     REVIEW OF SYSTEMS:   She has never had gout. She has not had any swelling in her feet. She does not usually have any stomach problems. All other systems are negative.    PFSH:  She eats a good diet. She lives alone, but she does not drive. She has a car, but it does not run. Her sister drives her around. She does not smoke or drink.     TOBACCO USE:  History   Smoking Status     Former Smoker     Quit date: 7/28/2012   Smokeless Tobacco     Never Used       VITALS:  Vitals:    11/08/17 1343 11/08/17 1345   BP: (!) 140/100 (!) 140/98   Patient Site: Left Arm Left Arm   Patient Position: Sitting Sitting   Cuff Size: Adult Regular Adult Regular   Pulse: 73    Weight: 192 lb 3.2 oz (87.2 kg)      Wt Readings from Last 3 Encounters:   11/08/17 192 lb 3.2 oz (87.2 kg)   10/25/17 199 lb (90.3 kg)   04/24/17 199 lb 9.6 oz (90.5 kg)     Body mass index is 35.73 kg/(m^2).    PHYSICAL EXAM:  Constitutional:  Reveals an alert but somewhat forgetful woman.  Vitals:  Per nursing notes.  Cardiac:  Regular rate and rhythm without murmurs, rubs, or gallops. Carotids without bruits. Legs without edema. Palpation of the radial pulse regular.  Lungs: Clear.  Respiratory effort normal.  Abdomen:   Bowel sounds positive, nontender, nondistended.  Neither liver nor spleen palpable.  Rheumatologic: Normal joints and nails of the hands.  Neurologic:  Cranial nerves II-XII intact.     Psychiatric:  Mood appropriate, memory intact.       MEDICATIONS:  Current Outpatient Prescriptions   Medication Sig Dispense Refill     ACETAMINOPHEN (TYLENOL ARTHRITIS PAIN ORAL) Take by mouth.       gabapentin (NEURONTIN) 600 MG tablet Take 1 tablet (600 mg total) by mouth 3 (three) times a day.  0     levothyroxine (SYNTHROID, LEVOTHROID) 100 MCG tablet TAKE ONE TABLET BY MOUTH ONCE DAILY 90 tablet 3     naproxen (NAPROSYN) 500 MG tablet Take 1 tablet (500 mg total) by mouth 2 (two) times a day with meals. 60 tablet 0      predniSONE (DELTASONE) 20 MG tablet Take 1 tablet (20 mg total) by mouth daily for 7 days. 7 tablet 0     Current Facility-Administered Medications   Medication Dose Route Frequency Provider Last Rate Last Dose     cyanocobalamin injection 1,000 mcg  1,000 mcg Intramuscular Q30 Days Jake Ruiz MD   1,000 mcg at 04/24/17 5404       ADDITIONAL HISTORY SUMMARIZED (2): Reviewed 10/25/2017 Dr. Landaverde note regarding Alzheimer's.   DECISION TO OBTAIN EXTRA INFORMATION (1): None.   RADIOLOGY TESTS (1): None.  LABS (1): Labs from 1/24/2017 reviewed.   MEDICINE TESTS (1): None.  INDEPENDENT REVIEW (2 each): None.     The visit lasted a total of 25 minutes face to face with the patient. Over 50% of the time was spent counseling and educating the patient about her pain.    ITaqueria, am scribing for and in the presence of, Dr. Ruiz.    I, Dr. Ruiz, personally performed the services described in this documentation, as scribed by Taqueria Prieto in my presence, and it is both accurate and complete.    Total data points: 3

## 2021-06-14 NOTE — PROGRESS NOTES
See [facility] notes in chart.   [facility] Financial Resource Guide  [phone]

## 2021-06-14 NOTE — PROGRESS NOTES
[facility] SW called pt at scheduled 2:00 phone meeting. SW called twice, pt did not answer. SW was going to [facility]

## 2021-06-14 NOTE — PROGRESS NOTES
Assessment  THe pt met with DEBBY to get assist with her phone as well as discuss in-home services--PCA. DEBBY assisted the pt with contacting her phone carrier company and the phone's . From what the pt reported, she was having issues with turning the phone on and no issues around charging the phone. SW assisted with contacting the  and went through prompts to test the phone. The phone did not indicate any technical issues through the testing--SW got the impression the pt may not understand how to use the phone. DEBBY gave pt's sister Saira the number for the [employer] to connect to see if there are different phone carriers that can offer simpler phones.     The pt was not agreeable to a referral to the [facility] for in-home services and became defensive when discussing have an RN come into the home to assess her for needs. DEBBY provided a document with information about in-home services via waiver. Please see Subjective section for more info. DEBBY got the impression that the pt's cognitive deficit made it difficult for the pt to follow the conversation when new information was introduced and discussed---SW had to repeat the information several times, did not feel the pt retained it or fully understood. The pt seemed to become defensive when SW discussed having an RN come into the home to assess her for needs--may be evasive with regard to her needs. Pt does not seem to want assist in the home at this time and will most likely require intervention (she will probably not seek assist/facility placement) eventually when she declines to a point where she is unable to care for herself anymore.      Action Plan:    will:  1) Available as needed       Care Guide will:  Care Guide Delegation:   1)  Due Date:  None at this time       Delegation:      Subjective   The pt was present for meeting with DEBBY to get assistance with her phone and to discuss in-home services--PCA. DEBBY asked the pt what  "was going on with her phone and the pt reported sometimes the phone won't turn on and it will be, \"off for a week.\" The pt stated that \"the green house comes on but nothing else.\" Pt reported this seems to happen around the holidays and weekends and that she once yelled at the phone and it finally turned back on. DEBBY asked for clarification on this--was she speaking to someone over the phone when this occurred and the pt reported no, but that \"they heard her and must've turned it back on then.\" SW clarified if the main issue was the phone not turning on--has it been charged and charging? Pt reported she keeps the phone charged all the time, it has not been turning on. SW assisted the pt with calling her phone carrier company "[employer] and was informed the pt was still under warranty for her phone through the phone company where the device is from--[employer].  connected pt and SW to [employer] for further assist, reported pt may need to get a new phone if there is a technical difficulty.    SW assisted pt with contacting [employer] and the  walked SW through some prompts to test the phone's ability to turn on and off without any issues.  first instructed SW to wipe the phone's cache--to clean up any extra data/downloads that may be slowing the phone down, then walked SW through prompts to check the phone's ability to turn on and off. DEBBY was able to do this without any issues. From conversation with , it would seem there was nothing wrong with the phone.  suggested for pt to try charging the phone in a different outlet.     The pt left the room to go and find her sister Saira--became confused and didn't come back, CG assisted pt with coming back to DEBBY's office, Saira came as well for remainder of appt. DEBBY explained the phone situation to pt and Saira and provided the numbers that DEBBY contacted to troubleshoot the issues with the phone. Saira asked if pt was just running out of minutes and this was why " her phone wouldn't work--pt doesn't always hang up the phone properly. DEBBY showed Saira and the pt how to check her minutes--calling the number with [employer] and informed them she currently has over 400 minutes. SW informed Saira and the pt that SW inquired about a simpler phone for the pt but the phone the pt currently has is the most simple they have in stock. DEBBY provided Saira with the [employer] number to call to inquire about different phone companies that may offer simpler phones for seniors with low cost.    DEBBY asked the pt about getting in-home services and the pt felt she needed homemaking assist--more so around cleaning out her home and selling furniture. SW explained that a PCA/homemaker would not assist her with this and would assist with light housekeeping or daily tasks. DEBBY provided pt with the info for [employer] and informed the pt they might be able to assist with cleaning out her home. Pt said she didn't want to clean out her home, she wanted to sell her belongings because she needed the money. DEBBY explained this agency might be able to do this but they would not be selling everything if it was not sellable, would throw out these items she didn't want. Pt was not agreeable to this. SW suggested [employer] and pt reported she didn't have access to the internet. SW suggest a yard/garage sale--pt said it's too cold for that now. SW informed pt that she could donate the items she didn't want, pt not agreeable to this. DEBBY asked pt if she wanted in-home services to assist with simple, daily household tasks pt said she did. DEBBY called pt's insurance and they do not provide in-home services.    DEBBY educated the pt and Saira on [facility] waiver programs for in-home services, due to pt not having MA she would qualify for Alternative Care (AC) Waiver. The pt did not understand this after DEBBY made several attempts to explain. DEBBY informed pt a RN would come out to her home and assess her for services, pt was  "very disagreeable to this and did not want a referral, pt reported, \"I don't need any of that.\" DEBBY printed off information for the pt that explained waiver services. DEBBY encouraged the pt to contact her CG should she change her mind about getting in-home services and SW can assist with completing a referral to the [facility]. DEBBY also provided pt and Saira with the numbers for her phone information as well as [employer] should the pt decide to clean out her home.    DEBBY provided the following information via paper document:    [employer] is the carrier for the plan--call about minutes and data availability:  Call us toll-free at [phone]    [employer] is the company you call for technical difficulties/replacement of phone  [phone]    [employer]--cleaning out the home/moving. Possibly could assist with selling furniture   [phone]    Objective    Appearance: Casually and appropriately dressed      Behavior: Agitated, mostly cooperative      Speech: Ordinary      Emotion/Affect: Confused at times, apprehensive/defensive, irritable      Thought Processes: Mostly relevant, difficulty understanding new information and following the conversation around new information      Orientation: not assessed      Memory: observed impairment      General Intellectual Abilities: not assessed      Judgment: possible impairment      Insight: impaired      Clinical Recommendations: DEBBY got the impression that the pt's cognitive deficit made it difficult for the pt to follow the conversation when new information was introduced and discussed---SW had to repeat the information several times, did not feel the pt retained it or fully understood. The pt seemed to become defensive when SW discussed having an RN come into the home to assess her for needs--may be evasive with regard to her needs. Pt does not seem to want assist in the home at this time and will most likely require intervention (she will probably not seek assist/facility " placement) eventually when she declines to a point where she is unable to care for herself anymore.

## 2021-06-15 NOTE — PROGRESS NOTES
Attempt 3: Care Guide called patient.  If this patient is returning my call, please transfer to Gayle at ext [phone].  I have called this patient 3 times over the past two weeks and have been unsuccessful in reaching her.  This patient has also not returned any of my messages.  I will continue attempting to reach out to this patient in one month.  I will also check this patient's chart for upcoming appointments, ER reports that may contain a new phone number, or any other recent activity.

## 2021-06-15 NOTE — PROGRESS NOTES
Discussed outreach with care team. The following was decided:    DEBBY Gordon will touch base with the SW at the Cognitive clinic (at ) and we'll go from there. CG will contact patient, but will wait to see what the SW says first.

## 2021-06-15 NOTE — PROGRESS NOTES
Assessment / Impression     1.  Dementia the Alzheimer type although mixed etiology cannot be excluded  2.  Delirium, persistent  3.  Depression    Plan:   1.   consult to plan placement into senior campus environment  2.  Continue present therapies    Long conversation with the patient and sister Belgica in attendance.  The patient's occupational therapy assessment revealed a moderate range of dementia with [place] 18/30 and CPT 4.4.  The patient was recently hospitalized for anxiety and palpitations for which no organic etiology could be identified.  She was noted to be remarkably confused in the hospital.  I suspect that the reasons for her hospitalization relate to lack of appropriate environmental support at home and this was explained to the patient and family.    Total time for evaluation 30 minutes with majority time spent in counseling.   to proceed as noted above.      Subjective:     HPI: Ginger Lewis is a 72 y.o. female with above-noted diagnoses who returns for follow-up.  The patient continues to live in her apartment largely independently but with increasing levels of emotional distress.  She was hospitalized in mid December with what appeared to be a significant delirium associated with a complaint of palpitations and possibly chest pain.  Cardiac evaluation was negative at that time.  Occupational therapy assessment indicates the need for environmental support that might, if unfulfilled, lead to medical and neuropsychiatric difficulties.          Review of Systems:          Patient offers no complaints at this time        Objective:     Vitals:    12/28/17 1114 12/28/17 1115   BP: (!) 215/104 (!) 185/80   Pulse: 72 63   Weight: 193 lb (87.5 kg)        No results found for this or any previous visit (from the past 24 hour(s)).    Physical Exam: As noted previously follow-up in 4 months as noted above.

## 2021-06-15 NOTE — PROGRESS NOTES
"Occupational Therapy  Occupational Therapy    Medicare Insurance    Units: 1 Eval, 1 ADL  Total Minutes: 50    Medical Diagnosis: Alzheimer's Dementia  Treatment Diagnosis: Cognitive Impairment  Referring Practitioner: Dr. Jose Guadalupe Landaverde  Date of onset: 12-12-17  Start of care: December 28, 2017    Ms.Patricia GOKUL Lewis was referred by Jose Guadalupe Landaverde MD for an occupational therapy outpatient cognitive evaluation. The assessments used in this evaluation will help determine if cognitive impairment is present and if so, how functional daily activity may be affected.  Following the assessments, the occupational therapist may offer education and recommendations to assist caregivers in providing the optimal level of support for their loved one. Ginger was seen on 12/28/2017 and was accompanied by her sister, who remained in the waiting room throughout our appointment.  Kayleigh presented as appropriately groomed and dressed and ambulated independently. She was generally pleasant, although towards the end of the appointment when she appeared to be challenged with some of the tasks she became slightly \"huffy\", stating \"this is so childish and stupid\".  The following information regarding I/ADL's was provided solely by Kayleigh.      Living Arrangement:  Kayleigh is currently living alone in her own apartment.   Homemaking:  She stated she manages all meals and cleaning by herself.   Financial Management: She stated she manages all finances independently.   Medications: Kalyeigh stated she sets up her own weekly pillbox and manages independently.   Driving:  She stated she is not currently driving due to her car being broken. When asked how long her car has been broken, she stated \"two years\".   ADL:  She stated she is independent with self cares.     Pain: Pat indicated that she has arthritis in her tail bone and it is giving her discomfort.     OBJECTIVE  Results of assessments are as follows:    CPT: 4.4 /5.6  MOCA: 18 /30    The above " "assessment scores indicate a Moderate  level of impairment.    ASSESSMENT  Recommendations:  Cognitive functioning recommendations: 4.4: The assessment scores indicate a recommendation for 24 hour supervision with a secure environment and a daily structured schedule. Ginger may benefit from cues for ADL tasks. Her caregiver is recommended to complete complex tasks such as meal preparation, medication and financial management or provide very close supervision when she is doing these tasks. Driving is not recommended due to difficulty with multi-tasking, divided attention and complex problem solving. Ginger likely has significant difficulty with judgment during daily activities and learning new skills. Ginger may be goal directed but she is unable to set her own goals. Verbal and visual cues used during ADL tasks will assist Ginger in optimal functioning. Her attention span is also significantly decreased. The use of memory aids (calendars, to-do lists, etc.) will likely be inconsistent and Ginger will respond best to verbal and visual cues to use memory aids. Further, Ginger may benefit from increased structure throughout the day creating healthy habits and routines and eliminating her need to problem solve from one task to another.    Following the completion of the testing, Kayleigh was interested in what her \"next steps\" will be in terms of living environment. She indicated that she \"knows she'll have to move into a nursing home\" but wants \"to know when\". I explained the test results to her and recommended she consider an assisted living environment with extra support at this time, telling her a \"nursing home\" is not likely needed at this time. She was very open and receptive to the idea of an assisted living, a place she could have her own little space with people around her to check in on her and help out. The scores indicate 24 hour supervision, although I believe an assisted living may be appropriate at " this time with the option of adding services as needed.       PLAN  Goal: Patient and/or family will verbalize understanding of recommendations regarding instrumental activities of daily living and activities of daily living.    Plan of Care: Patient/ Family instruction: Expected Functional Outcomes     Thank you for this referral.  If I can be of further assistance, please do not hesitate to contact me.    MEDICARE PATIENT: Yes: HCIN #[id number]; Provider # ; Certification Dates: from 12-28-17 to 12-28-17    Physician Recommendation:  1. I certify the need for these services furnished within this plan and while under my care. I agree with the therapist's recommendation for plan of care.  2. If there is any recommendation for modification of therapy plan, please indicate below.    Macarena Hernández, OT

## 2021-06-15 NOTE — PROGRESS NOTES
Received a call from [facility] regarding pt's care needs. Message left with Nita ([phone])    Belgica To [facility]  1/8/18  [phone]

## 2021-06-15 NOTE — PROGRESS NOTES
CG received vm from patient explaining she received my letter (Patient/Family contact form). Pt would like a call back to discuss this.  Pt did not answer upon call back. CG left vm for pt.

## 2021-06-15 NOTE — PROGRESS NOTES
Will ask PCP at [facility] what next steps are. Pt refusing all services. [facility] has recommended a senior campus environment.

## 2021-06-15 NOTE — PROGRESS NOTES
Attempt 2: Care Guide called patient.  If this patient is returning my call, please transfer to [facility] at ext [phone].

## 2021-06-15 NOTE — PROGRESS NOTES
Persons accompanying you (the patient) today: sister mary    How have you been doing since we saw you last? Please note any concerns.  Doing ok is very agitated with the testing this morning    Please list any recent hospitalizations/surgeries/procedures you've had since we saw you last:  no    Have you had any falls since your last visit? No    Do you have any pain today? Yes: tailbone and left knee

## 2021-06-15 NOTE — PROGRESS NOTES
Consulted with RN and SW on next steps. See encounters from Nuvia Garcia. CG left pt a vm explaining that we need her permission to speak with her sister Belgica. CG has also mailed pt a family/friend contact form for her to add Belgica too. CG pushed outreach to two weeks if no returned call/mailing.    CG sent email to [facility] and told them to hold off on contacting the patient at this time. Care Team will review chart for updates.

## 2021-06-16 NOTE — PROGRESS NOTES
CG reviewed chart before outreach call to sibling Saira. Pt will establish care with new PCP (Dr. Bryan) on Friday 3/23. Saira was instructed to meet with CG after for any needs they have.    Outreach set to 3/23/18

## 2021-06-16 NOTE — PROGRESS NOTES
Sandra. Received a call from [facility] who will be assisting in finding placement for pt.    Belgica To [facility]  3/6/18  1400

## 2021-06-16 NOTE — PROGRESS NOTES
CCC DEBBY received message from VICTOR MANUEL Dickson. Belgica has been in contact with Saira, pt's sister to coordinate getting pt's needs met--see Belgica's note from 2/12/18.     [facility] FRG can assist Saira with completing the MA application if Saira is needing this help. SW can meet/call Saira to provide additional education/support on recommendations that Belgica had discussed with Saira and complete EW referral for assessment once Saira has completed the MA application.    DEBBY has sent message to  to assist Saria with coordinating getting needs met with [facility] assist.

## 2021-06-16 NOTE — PROGRESS NOTES
Spoke with pt's sister Saira (who is the HC agent. She assumed that both clinics were looking for placement for pt. I emailed Saira the link for MA application.  Pt. Will also need a EW waiver completed. Gave Saira contact info for [employer] ([facility] [employer] and [facility]. Also reccomended Saira obtain Durable POA for finances. Although we aren't able to find placement we can assist with the process.         Belgica To [facility]  2/12/18  [phone]

## 2021-06-16 NOTE — PROGRESS NOTES
CG will not be here at the time of Establish care appt today 3/23/18. Pt/family will be called 2 weeks after last outreach of 3/21/18.    Next outreach: 4/6/18

## 2021-06-16 NOTE — PROGRESS NOTES
[facility] Follow Up Note    Assessment/Plan:    1. Chronic right shoulder pain  Elements of bicipital tendinitis and rotator cuff tendinitis.  In the past physical therapy has not been able to resolve the problem.  We will do x-ray today and I recommended that she sees an orthopedist for injection.  - XR Shoulder Right 2 or More VWS  - Ambulatory referral to Orthopedics    2. Chronic pain of left knee  Exam is benign today but we will do an x-ray.  She may benefit from steroid injection and it helped her in the past.  Referral to orthopedics  - XR Knee Left 1 or 2 VWS  - Ambulatory referral to Orthopedics    3. Visit for screening mammogram  Patient declined mammogram today.  I did do a breast exam which was normal.    4. Hypothyroidism, unspecified type.  Patient reports that she had thyroid cancer and had total thyroidectomy when she was 30  We will check thyroid levels and thyroglobulin due to previous history of thyroid cancer  - Thyroid Stimulating Hormone (TSH)  - Thyroglobulin Antibody  - Thyroglobulin, Tumor Marker    5. Vitamin D deficiency  - Vitamin D, Total (25-Hydroxy)    6. Postmenopausal  - DXA Bone Density Scan; Future    7.  Alzheimer's dementia.  Patient does have moderate dementia with a smoker of 18 out of 30.  She also is prone to depression and delusional thinking but currently refuses Aricept, Cymbalta or Seroquel.  Asked her to bring all of her medications to the visit next time.  I appears that maybe she is just taking gabapentin and levothyroxine as is prescribed medications.  Sister is looking into putting her into assisted living.  Recent brain MRI was normal.    Liliya Bryan MD    Chief Complaint:  Chief Complaint   Patient presents with     [facility] Care     pt states does not want to have mammo or colon        History of Present Illness:  Ginger is a 72 y.o. female with history of high blood pressure, Alzheimer's dementia, chronic low back pain, osteoarthritis,  trigeminal neuralgia (she is on Neurontin), hypothyroidism, IBS, prediabetes, morbid obesity and sleep apnea.  She is currently here to establish care.  She is accompanied by her sister.    Patient's biggest concern today is ongoing pain in her right shoulder and left knee.  Patient has had several falls over the winter.  Right shoulder pain started in 2016 when she fell on ice.  She has had injections and physical therapy and pain has improved but she still has a low level 4 out of 10 discomfort.  It does bother her at night and is just irritating.  She has full range of motion in her shoulder but does have mild tenderness with internal rotation and bicipital tendon palpation.  Discussed referral to orthopedist for possible injection.    Left knee pain on and off fluctuates.  There is no effusion on exam today.  We will do an x-ray and also have her see an orthopedist.    Patient has Alzheimer's dementia.  She still living by herself but sister is looking into assisted living places where she can move.  Patient was hospitalized in December of last year of his confusion and delusions.  She was started on Cymbalta and Seroquel.  Currently she is refusing all the medications including Aricept Cymbalta and Seroquel and most of preventative care.  She does take her thyroid medication regularly and will check levels today.  In December 2017 she saw Dr. Landaverde and her tasting showed a mocha score of 18 out of 30.  Most recently blood work showed LDL of 151 and A1c of 5.2.  Currently patient is fairly stubborn.  She is declining colonoscopy and mammogram and pneumonia shots.  She was interested in bone density testing however and that was ordered.    Review of Systems:  A comprehensive review of systems was performed and was otherwise negative    PFSH:  Social History: Reviewed  History   Smoking Status     Former Smoker     Quit date: 7/28/2012   Smokeless Tobacco     Never Used     Social History     Social History  "Narrative    Patient has Alzheimer's dementia.  She lives by herself but history is 4 blocks away and helps out.  Currently sisters helping patient to find assisted living where she can move.  Currently patient still cooks and has not had any incidences with the stove.  She does not drive.  She administers medications to herself but does not remember sometimes exactly what medications are.  She wears glasses.       Past History: Reviewed  Current Outpatient Prescriptions   Medication Sig Dispense Refill     ACETAMINOPHEN (TYLENOL ARTHRITIS PAIN ORAL) Take 650 mg by mouth every 6 (six) hours as needed (pain).        ascorbic acid, vitamin C, (VITAMIN C) 500 MG tablet Take 500 mg by mouth daily.       gabapentin (NEURONTIN) 300 MG capsule Take 1 capsule (300 mg total) by mouth at bedtime. 30 capsule 0     gabapentin (NEURONTIN) 600 MG tablet TAKE TWO TABLETS BY MOUTH THREE TIMES DAILY 540 tablet 2     levothyroxine (SYNTHROID, LEVOTHROID) 100 MCG tablet Take 100 mcg by mouth Daily at 6:00 am.       multivitamin therapeutic tablet Take 1 tablet by mouth daily.       ibuprofen (MOTRIN IB) 200 MG tablet Take 2 tablets (400 mg total) by mouth 2 (two) times a day. 100 tablet 2     No current facility-administered medications for this visit.        Family History: Reviewed    Physical Exam:    Vitals:    03/23/18 1621   BP: 126/72   Patient Site: Left Arm   Patient Position: Sitting   Cuff Size: Adult Regular   Pulse: 66   Weight: 191 lb (86.6 kg)   Height: 5' 0.5\" (1.537 m)     Wt Readings from Last 3 Encounters:   03/23/18 191 lb (86.6 kg)   12/28/17 193 lb (87.5 kg)   12/13/17 191 lb 9 oz (86.9 kg)     Body mass index is 36.69 kg/(m^2).    Constitutional:  Reveals a pleasant elderly female accompanied by her sister.  Vitals:  Per nursing notes.  HEENT:No cervical LAD, no thyromegaly,  conjunctiva is pink, no scleral icterus, TMs are visualized and normal bl, oropharynx is clear, no exudates,   Cardiac:  Regular rate and " rhythm, mild systolic murmur noted,  Legs without edema. Palpation of the radial pulse regular.  Lungs: Clear to auscultation bl.  Respiratory effort normal.  Abdomen:positive BS, soft, nontender, nondistended.  No hepato-splenomagaly  Skin:   Slight rash under her left breast  Rheumatologic: Full range of motion in her right shoulder reveals negative empty test scan however she did have some discomfort with internal rotation and bicipital groove palpation.  Left knee with normal range of motion, no effusion and no pain on exam.  Neurologic:  Cranial nerves II-XII intact.     Psychiatric: affect appropriate, although patient is somewhat argumentative and stubborn.  Memory is deficient, she is poor historian..     Data Review:    Analysis and Summary of Old Records (2): Yes    Records Requested (1): No    Other History Summarized (from other people in the room) (2): Yes sister    Radiology Tests Summarized (XRAY/CT/MRI/DXA) (1): Yes brain MRI    Labs Reviewed (1): Yes    Medicine Tests Reviewed (EKG/ECHO/COLONOSCOPY/EGD) (1): No    Independent Review of EKG or X-RAY (2): No

## 2021-06-16 NOTE — PROGRESS NOTES
The patient was discussed during weekly Care Team Pod Touch Base today. Goals and barriers were discussed and a plan was established.     Goals        Patient Stated      I would like to organize my apartment and sale stuff I no longer need.  (pt-stated)            Action steps to achieve this goal  1.  I have things in my apartment that I want to sale but without Internet is hard for me to post anything. Also if I were to have Internet, my phone has limited minutes and  does not help with people trying to call me.   2. I will contiune to look for other ways to sale my stuff.      No Update on this goal 6/5/17, 2/27/18  Date goal set:  05/28/15        My memory is getting worst, I need help keeping things in order. (pt-stated)            Action steps to achieve this goal  1.  I michael help with making appointments and keeping them together.  2.  I will see memory care staff at [facility] as scheduled.  3. I will allow help from my sister to keep things in order.   4. I will continue with PT appts.     Date goal set:  4/24/17  Ongoing goal              Barriers: Pt needing AL facility, needing to get MA LTC application completed first.    Action Plan if indicated: CG updated team that pt's sister Saira and the pt have a better relationship now and that Saira was able to get the MA LTC application completed. Saira wanting to wait until the pt is approved for MA to look for placement.

## 2021-06-16 NOTE — PROGRESS NOTES
Received message from clinic-[facility] would like to know where MA/LTC application was mailed.    Care Guide called [phone] and left vm with the following info:    [facility] Disability Services   [address]   [place] [address]    No return call necessary unless sending error.

## 2021-06-16 NOTE — PROGRESS NOTES
Received completed [place] Dept [employer] Health-MA/LTC application. CG had mailed this on behalf of patient today 3/6/18.     Next Outreach: 3/20/18

## 2021-06-16 NOTE — PROGRESS NOTES
Per SW, CG needs to connect with sister Saira for outreach today.    Saira reports she was able to have Pat sign the MA application and LTC form yesterday. See FR notes regarding this.    Saira talked about Pat screwing up her medications. CG offered RN MEV visit. Saira has declined this. CG talked about the machine that reminds pt's verbally and dispenses medication when due. Saira declined.     Saira seems to be in good spirits and has a better relationship with her sister lately. CG explained that the MA application process can take months. Saira will reach out with any emergencies related to her living alone in the mean time. Pt has also expressed not wanting to see Dr. Ruiz any longer. She has been clinic/new doctor shopping lately. CG explained to Saira that if Pat leaves this clinic this CG will not be able to work with them any longer. Pt understanding and wishes to stay with [facility] at least.

## 2021-06-16 NOTE — PROGRESS NOTES
See [facility] notes in chart.   [facility] Financial Resource Guide  [phone]

## 2021-06-16 NOTE — PROGRESS NOTES
Spoke to Saira about the FRG potentially calling her soon. FRG will assist with MA application. Pt will then be eligible to apply for EW. Please see SW notes regarding this. Saira would really like something to start happening as her sister should not be living alone. CG explained that she had put in a referral to [facility] but Pat did not want her sister involved at that time. Pt has not returned calls regarding speaking to her other sister Belgica. Saira was reassured that we are working towards a goal here and to expect a call from Rosa soon.

## 2021-06-17 NOTE — PROGRESS NOTES
Assessment / Impression     1.  Dementia the Alzheimer type    Plan:   1.  At this point in time the patient does not appear to be appropriate for guardianship.  It would appear that guardianship will eventually be required however    A long conversation held with the patient and sister in attendance.  The patient does demonstrate capacity to make some decisions at this point in time and healthcare power of  would appear to be still appropriate.  At this point in time I would not support court petition for guardianship.    Total time for evaluation 30 minutes with majority time spent counseling.      Subjective:     HPI: Ginger Lewis is a 72 y.o. female with Alzheimer's disease who returns for follow-up.  The patient is seen today because of concern that a guardianship may be required.  The patient's sister who is present today does have healthcare power and reports no difficulties in managing the patient's needs thus far.  The patient continues to live in an apartment independently but with increasing assistance from family members.    The patient today does demonstrate some awareness regarding her current clinical condition.  She does demonstrate the ability to manipulate information and to at least establish in her own mind the plan for receiving assistance should that time arise.  It would appear that there is some limitation in insight but insight is not completely absent.          Review of Systems:          No new complaints        Objective:     Vitals:    03/28/18 1443   Weight: 192 lb (87.1 kg)       No results found for this or any previous visit (from the past 24 hour(s)).    Physical Exam: The patient is neatly groomed casually dressed and seated for evaluation.  And noted to be alert and engaged during the course of evaluation.  No fluctuation in level of consciousness or sandy distractibility noted.

## 2021-06-17 NOTE — PROGRESS NOTES
The patient was discussed during weekly Care Team Pod Touch Base today. Goals and barriers were discussed and a plan was established.     Goals        Patient Stated      I would like to organize my apartment and sale stuff I no longer need.  (pt-stated)            Action steps to achieve this goal  1.  I have things in my apartment that I want to sale but without Internet is hard for me to post anything. Also if I were to have Internet, my phone has limited minutes and  does not help with people trying to call me.   2. I will contiune to look for other ways to sale my stuff.-See [facility] notes from 4/5. Pt was given resources for moving her things.      No Update on this goal 6/5/17, 2/27/18  Date goal set:  05/28/15  Updated 4/13/18        My memory is getting worst, I need help keeping things in order. (pt-stated)            Action steps to achieve this goal  1.  I michael help with making appointments and keeping them together.  2.  I will see memory care staff at [facility] as scheduled. Ongoing/Complete  3. I will allow help from my sister to keep things in order. Pt has allowed sister to help out lately.  4. I will continue with PT appts.     Date goal set:  4/24/17  Ongoing goal            Barriers:    Action Plan if indicated:     Have RN review. File [facility] report as appropriate, continue to work with .

## 2021-06-17 NOTE — PROGRESS NOTES
Persons accompanying you (the patient) today: sister Saira    How have you been doing since we saw you last? Please note any concerns.  Not good, Says life sucks. Agitated unable to get blood pressure due to it started to inflate again and was not happy at all.    Please list any recent hospitalizations/surgeries/procedures you've had since we saw you last:  no    Have you had any falls since your last visit? No    Do you have any pain today? Yes: Shoulder pain             2

## 2021-06-17 NOTE — PROGRESS NOTES
Writer was called by Belgica Zamora  from [facility] who verbalize concern with respect to patient's ability to make appropriate decisions regarding healthcare and managing her finances.  Pt's sister Saira is currently involved with trying to assist patient with finding alternate living and applying for MA although according to Belgica Zamora patient is not being cooperative and is making it very difficult for her to help with these matters.    Plan: Writer recommended that Belgica Zamora [facility] send no specific concerns to Dr. Landaverde as well as, encourage Saira to set up a follow-up appointment with with Dr. Landaverde order to possibly reevaluate patient's decision-making capabilities.    [facility]   [facility]  0975

## 2021-06-17 NOTE — PROGRESS NOTES
"CCC SW received call back from EBONIE Mclain CM.    Belgica updated  that the pt is now declining any in-home services and moving to AL. Pt's sister Saira was attempting to assist the pt to apply for MA-LTC to get EW to pay for AL, however, the pt \"kept blocking\" Saira from getting the needed supporting documents for the MA application (income info, bank statements, assets, etc.). Belgica informed  that even though the pt has refused to get services or follow through with the MA application to get AL she \"continues to be contrary and will ask about when she's moving to AL.\"     The pt has also displayed behaviors around her financial management. The pt did not pay her rent last month and spent her social security on \"bart and repairing her car\" and is now driving again--which Belgica is very concerned about. Saira also looked through pt's finances with the pt and discovered the pt has been consistently over drawn for her account.     Belgica also informed SW that the pt gets confused on her medications and how to manage them, however, she also is refusing RN services in the home to assist with med management. Belgica also reported she is concerned the pt is not eating enough or remembering to eat--did not have much food in her fridge.     Saira, pt's sister, is the pt's health care agent but not the POA, so Saira is unable to finish MA-LTC application without pt's consent due to needing supporting documents that are financially related.     Belgica also called Dr. Landaverde's office and updated the DEBBY JONES There on this info. Belgica JONES Asked Belgica GONSALEZ To submit a written report of all of her concerns and then schedule an OT appt to evaluate the pt for her cognitive abilities. Belgica GONSALEZ Informed DEBBY that no one will take the pt to this appointment--family won't; and the pt will not go on her own accord if it gets scheduled.     Belgica wanting to know what PCP's opinion is on the pt's competency and ability to make safe " decisions for herself as well as opinion on pt's safety with driving.     CCC SW will discuss with CCC team and forward this information on to PCP to advise.

## 2021-06-17 NOTE — PROGRESS NOTES
5/7/2018: FRG didn't complete application previously but assisted.  Patient is active with no concerns. No further FRG assistance needed at this time. If patient needs assistance in the future, please send a new referral.     This subscriber has eligibility for AC: Alternative Care Program, Service Agreement Required. AC covers skilled nursing care in the home, but does not include skilled nursing facility coverage.   Elig Type AC: Alternative care  Eligibility Begin Date: 03/12/2018  Eligibility End Date: 03/13/2019

## 2021-06-17 NOTE — PROGRESS NOTES
Patient states that she is not looking for Assisted Living her sisters are looking for it.  Patient states that she is doing well with walking and exercising and that she has lost about 70 pounds.  Patient also states that she has friends in the building that she does things with along with exercises.  While Care guide was talking the call was dropped, didn't know if the patient hung up what happened.  Care guide did not call back incase the patient did hang up.    Next Outreach: 5/15/18

## 2021-06-17 NOTE — PROGRESS NOTES
"CCC SW received message from  stating pt has a [facility]  and there is an PORFIRIO from 3/5/18.  called the worker named Belgica Zamora to consult/collaborate.    Belgica informed  that the pt had walked to their agency (located down the street) and had asked them for help--pt was assigned an adult services worker, agency has been working with the pt a little under 2 months now.    The adult services worker referred the pt for waiver and pt has already been assessed for waiver services--getting AC waiver currently with Belgica as the  for services. Belgica informed  that Saira and Mica (RN with [facility] assessment) have been working on an MA application to get pt MA and potentially EW instead of the AC so pt could have assistance with paying for facility placement.    At this time, the pt has been open to AL and has looked at 2 locations--[facility] and [facility]. The pt has been open to placement at [facility] and has begun working on the application with Saira, however, due to her cognitive abilities, there have been barriers as the pt will often forget that she's already toured the facility and will go back and forth on agreeing with pursuing application and assessment for the facility.    Belgica also wondering if pt will be appropriate for AL or if she will be needing memory care; will have to have RN from facility assess to get determination on if AL will be an option.    Belgica also mentioned that the pt has \"boxes piled up to shoulder height\" throughout her home with \"little walkways\" to get around. Belgica and pt's sister Saira have been trying to discuss this with the pt and hiring an agency to come assist clean out her home, however, the pt has gone back and forth on coming to a decision on this---aware she needs to do this but is hesitant to do so. Belgica feels there is mental health concerns involved with this and this will be out of scope for the workers that will " be coming into the home to help her for the time being--such as a homemaker and PCA. Pt would benefit from mental health intervention, Belgica looking into [facility] to see if a worker could come out to meet with the pt; at this time pt doesn't qualify for ARMHS due to not having MA--once MA is established, ARMHS could be an option.    Belgica informed DEBBY that the main barriers at this time to assisting the pt is her inability to make decisions and the pt's sister Saira (who is medical POA) struggling with care giver role and stress. Belgica has referred Saira to care giver support groups (Saira did not find these helpful). Belgica also referred Saira to [facility] for the care giver coaching program that should be covered by the waiver. Belgica waiting on this referral to see if Saira can get services.    Belgica informed DEBBY that currently the pt is still at home alone with her sisters checking in on her as well as a neighbor checking in. Belgica informed DEBBY the pt will eventually get some additional in-home services such as someone to assist with homemaking and cooking needs. Belgica worried about pt as a wander risk and getting lost--looking into program Safe Return for this. Belgica informed DEBBY that guardianship was discussed, however, pt refusing to relinquish independence at this time and neurology also does not feel that guardianship is appropriate at this time.     DEBBY forwarded encounter to PCP to see if PCP has any input or concerns on current supports or any needs that were not discussed.

## 2021-06-17 NOTE — PROGRESS NOTES
CCC SW received call from Belgica Zamora,  waiver  for pt. Belgica reported she had some concerns she wanted to update CCC SW with as well as the clinic.    SW called back, had to leave .

## 2021-06-17 NOTE — PROGRESS NOTES
Reviewed notes from VICTOR MANUEL Lloyd dated 4/5/18. It seems though patient is getting services through her [facility] . This person can be reached at [phone] . CG will forward chart to CCC DEBBY Moya for plan of next outreach.

## 2021-06-17 NOTE — PROGRESS NOTES
Writer had a lengthy conversation with [facility]  for alternative care Belgica Zamora in regard to the continued difficulties occur when he is facing and trying to assist patient finding a more structured setting such as assisted living and/or memory care assisted living.  According to Belgica Zamora patient has been inconsistent following through with the necessary financial information required to have her apply for medical assistance and ultimately elderly waiver.  Information was given to  with respect to accompany that can assist with organizing and moving home items called ([employer].)  Your recommended that  also speak with patient's sister Saira with respect to obtaining a DURABLE POWER OF  for finances in which she can then assist with gathering the pertinent information to apply for MA and elderly waiver.   During patient's last visit with Dr. Landaverde, or spoke with patient's sister Saira who at this time wanted to continue as patient's POA for healthcare although, according to  sister Saira is wanting to relinquish those responsibilities.    Plan: Recommend that patient have a DURABLE POWER OF  for finances.      Belgica To [facility]  1135  4/5/18

## 2021-06-17 NOTE — PROGRESS NOTES
[facility] 's phone number is [phone].    Belgica To [facility]  4225  4/5/18

## 2021-06-18 NOTE — PROGRESS NOTES
Persons accompanying you (the patient) today: self    How have you been doing since we saw you last? Please note any concerns.  Patient is angry because of OT testing. She says she doesn't want to come back here anymore. She said there is nothing wrong with her    Please list any recent hospitalizations/surgeries/procedures you've had since we saw you last:  none    Have you had any falls since your last visit? No    Do you have any pain today? No

## 2021-06-18 NOTE — PROGRESS NOTES
".OUT PATIENT-CLINICAL SOCIAL WORK PROGRESS NOTE      6/12/2018         DATA:  Ginger Lewis is a 72 y.o. female with early stage Alzheimer's and depression per medical record. Social work was asked to support Pat in identifying a potential alternate healthcare agent to support her in her healthcare decision-making. Patient also requested support with how to manage her medbox as she states she has a hard time with that.    ASSESSMENT: Patient stated she \"feels angry\" in regards to the clinic and in regards to feeling distanced from her family. Patient reports no longer wanting her family involved in her healthcare. Patient reports feeling as though there is no one she can trust or rely on to help her with her medbox or with healthcare decisions. Patient, , and Social Work Intern brainstormed with Patient ways she can get support managing her medications, either by asking a pharmacist at her pharmacy for help or that we could connect her with a pharmacist at [facility] for ideas on medication management. This writer also provided information on nutrition that can be helpful for cognition as patient showed interest. This writer recognized a disconnect in that Patient is reporting feeling independent and like she can take care of herself though some of her statements regarding her medbox suggest she needs more support in her environment.     PLAN: Social work will follow-up with provider and with county worker regarding environmental support. Social work will be available should patient have concerns or resource needs.     Type of Service: Office Visit    Services Provided: Assessment, Education and Resources    Resources Provided: Nutrition Informational Packet      Janneth Dennison,  Work Intern  6/11/18 1630    I have read, discussed, and agree with the documentation as presented by Janneth Dennison Social Work Intern.    Belgica To [facility]  6/11/18 1631    "

## 2021-06-18 NOTE — PROGRESS NOTES
.OUT PATIENT-CLINICAL SOCIAL WORK PROGRESS NOTE      6/15/2018         This writer faxed physician's completed guardianship/conservatorship paperwork to Belgica Zamora at [facility] (F: [phone]). Physician will also be writing a letter re: driving for the DMV.     Janneth Dennison, Social Work Intern  6/15/18 0904    I have read, discussed, and agree with the documentation as presented by Janneth Dennison, Social Work Intern.    Belgica To, [facility]  6/15/18 0963

## 2021-06-18 NOTE — PROGRESS NOTES
"  Assessment / Impression   1.  Dementia the Alzheimer type early stage  2.  Depression      Plan:   1.   consultation.  I do wish our  to connect with the patient's county worker to ensure appropriate environmental support.  Based on our assessments patient would require 24/7 supervision with CPT test score 4.6 today  2.  Once appropriate placement with appropriate medication supervision has been secured, we could consider initiation of medication therapies    Total of time for evaluation today 30 minutes.  The patient was noted to be quite upset today regarding a need for yet another assessment.  Please note that the patient presented with in the past month because of a request regarding support for guardianship over this patient.  At that time I was not convinced that the patient required a guardian instead believing that power of  would be appropriate along with healthcare power.  The patient today informs me that her sister Saira, who has been her power of  and her healthcare agent in the past, is \"no longer in the picture.\"  Because of this the patient does require likely a conservator in lieu of family members who would be willing to take responsibility for her care.  I will have our social service team work these elements out.    I will plan to see the patient back in 6 months or I could see the patient sooner depending on progress with respect to the above-noted matters.      Subjective:     HPI: Ginger Lewis is a 72 y.o. female with above-noted diagnoses who returns for follow-up.  The patient presents alone today stating that she has tight all relationship to her family.s admitting significant anger, she was not cooperative with much of our functional assessment claiming that this examiner is not really helping her but rather repeatedly assessing her for \"research.\"  She claims that this examiner was supporting a court petition for guardianship.  I did " review my prior note with the patient to attempt to correct this impression.    The patient's defiant hostile and a bit difficult to work with today.  No clear evidence of physical distress.          Review of Systems:          As above        Objective:     Vitals:    06/11/18 1502 06/11/18 1505 06/11/18 1506   BP: (!) 198/90 (!) 180/92 (!) 189/96   Pulse: 65 77 73   Weight: 185 lb (83.9 kg)         No results found for this or any previous visit (from the past 24 hour(s)).    Physical Exam: As noted previously.  The patient appears to be medically well.  I do note her to be attentive and although angry, she demonstrates no evidence of significant confusion.  Limited insight is noted however.

## 2021-06-18 NOTE — PROGRESS NOTES
Care guide spoke to sister Saira today for an update on patient. Saira states that Pat has asked her to stop helping her and is not speaking to her at this time. She has been working with the [facility] workers who advised to allow her to make these choices. Saira reports that Pat used her last check to pay to get her car out of storage and fixed and is now driving again.She is worried about this, she is also concerned as she states she used her check to do this rather than pay for her rent.     Saira will continue to work close with the [facility] to get direction and to help Saira.  Care Guide will send a message to PCP regarding patients driving.     Next outreach:6/15/18

## 2021-06-18 NOTE — PROGRESS NOTES
"Occupational Therapy  Occupational Therapy    Medicare Insurance    Units: 1 OT evaluation  Total Minutes: 30    Medical Diagnosis: Alzheimer's disease  Treatment Diagnosis: Cognitive Impairment  Referring Practitioner: Liliya Bryan MD  Date of referral: 6/11/2018  Start of care: June 11, 2018    Ms.Patricia GOKUL Lewis was referred by Liliya Bryan MD for an occupational therapy outpatient cognitive evaluation. The assessments used in this evaluation will help determine if cognitive impairment is present and if so, how functional daily activity may be affected.  Following the assessments, the occupational therapist may offer education and recommendations to assist caregivers in providing the optimal level of support for their loved one. Ginger was seen on 6/11/2018.  Patient arrived 25 minutes late to her appointment after having gotten lost while driving.  She appeared well groomed.  Patient was unaware of the purpose of her OT eval.  Patient became very agitated when she learned that she would be completing cognitive assessments.  Patient stated that she felt the assessments were \"childish\" and \"unnecessary\".  Patient  ambulated independently.  Patient completed 3/5 subtests on the CPT.  During the medication management subtest patient made errors with 75% medications.  Patient unable to correct medication errors. Patient demonstrated decreased attention during additional CPT sub-tests.  Patient refused to complete the last two sub-tests.    Patient unable to complete trailmaking task on the MoCA assessment.  Patient became quite frustrated when she was unable to receive additional instruction.  Patient completed draw-a-clock sub-test on MoCA.  Patient demonstrated accuracy with clock contour and numbers but patient was unable to recall the stated time, thus unable to draw the hands on the clock.      Living Arrangement:  Patient is living in an apartment by herself. Patient unable to state address. "   Homemaking:  Patient reports doing all housework.   Financial Management: Patient reports managing all finances.   Medications: Patient reports setting up a weekly pill box; patient frustrated by amount of pills she must manage  Driving:  Yes; limits driving to local familiar routes  ADL:  Patient reports no problems  Leisure: Patient reports no problems    Pain: Yes, patient reports feeling achy.      OBJECTIVE  Results of assessments are as follows:  ACL: Not tested due to time constraints  Modified CPT: 4.6/6.0  Sub-Test Score   Medbox   4.5/6   Shop   5/6   Wash   4.5/5         Halfway - patient refused  --/6   Phone - patient refused  --/6                Average Score 4.6/6     MOCA: Patient refused to complete MoCA; see above for more details      ASSESSMENT  Recommendations:  Cognitive functioning recommendations: 4.6: The assessment scores indicate a recommendation for an assisted living environment with daily check-in supervision (i.e. at home with assistance or assisted living facility). Assistance with managing medications and finances is recommended as Ginger may have increased difficulty with complex problem solving. Learning new skills and information may be very challenging for Ginger but she may be able to do so with a lot of repetition. Problem solving is often challenging especially when it is not anticipated or expected. Ginger may lack insight into her deficits indicating an increased need for assistance with complex tasks requiring accuracy for safety. Supervision is recommended for Ginger when using hot tools and major appliances during meal preparation. Driving is not recommended for Ginger due to difficulty with divided attention and complex problem solving. Further, Ginger may benefit from increased structure throughout the day creating healthy habits and routines and eliminating her need to problem solve from one task to another.    PLAN  Goal: Patient and/or family will  verbalize understanding of recommendations regarding instrumental activities of daily living and activities of daily living.    Plan of Care: One-time OT melany    Thank you for this referral.  If I can be of further assistance, please do not hesitate to contact me.    MEDICARE PATIENT: Yes: HCIN #[id number]; Provider # ; Certification Dates: from 6/11/2018 to 8/2/18.    Physician Recommendation:  1. I certify the need for these services furnished within this plan and while under my care. I agree with the therapist's recommendation for plan of care.  2. If there is any recommendation for modification of therapy plan, please indicate below.    YUKO Earl, OTR/L on 6/11/2018

## 2021-06-18 NOTE — PROGRESS NOTES
Care guide called and spoke to Saira for an update on Pat. Saira states Pat for the most part is still not talking to her. She comes over to yell at her and get mad about things that did not happen. Saira was in contact with the clinic per notes regarding patients driving. Care guide advised today that per notes it appears Dr Landaverde will be sending a letter to the DMV. He is also working with the county for the best options for Pat to be sure she is safe and has the help she needs. Per saira it is hard stepping back and letting her fail but she is relieved that people are seeing that she needs help and is hopeful she can get it as all she wants is for her sister to be safe.     Next outreach: 6/28/18

## 2021-06-18 NOTE — LETTER
Letter by Liliya Bryan MD at      Author: Liliya Bryan MD Service: -- Author Type: --    Filed:  Encounter Date: 2019 Status: (Other)       Patient:  Ginger Lewis  :  1945      Physician Orders:      1. Nursing staff to administer levothyroxine (SYNTHROID, LEVOTHROID) 125 mcg tablet PO daily.    2.  Repeat TSH lab to be done at the end of 2019        Electronically Signed,        Liliya Bryan MD

## 2021-06-18 NOTE — PROGRESS NOTES
"Writer was able to contact patient.  She was asked to make arrangements to come in to discuss with Dr. Landaverde concerns related to patient's driving ability.Patient stated \"there is nothing wrong with my driving and I am not coming in or going to talk to him about it.\"     Writer informed pt. that Dr. Landaverde would then need to contact the Division of Motor Vehicles  regarding his concerns about her ability to drive.        Belgica To [facility]   6/13/18  1014    "

## 2021-06-18 NOTE — PROGRESS NOTES
After consultation with Dr. Landaverde it was decided that Dr. Landaverde will be requesting that patient have a conservator as well as legal guardian.   Writer spoke with Belgica Zamora  from [facility] who will be seen the necessary paperwork for Dr. Landaverde to complete.    Belgica To LICSW  6/12/18  1400

## 2021-06-18 NOTE — PROGRESS NOTES
Writer attempted to call pt. This a.m. Unable to reach. Left  for her to return call.    Belgica To [facility]  6/13/18  0900

## 2021-06-19 NOTE — PROGRESS NOTES
Per [facility] request, this writer [facility]ed [facility]  (F: [phone]). This letter had also been mailed to [facility] on 6/20/18.      Janneth Dennison, Social Work Intern  6/28/18 1040    I have read, discussed, and agree with the documentation as presented by Janneth Dennison, Social Work Intern.    Belgica To, [facility]  6/28/18 1046

## 2021-06-19 NOTE — PROGRESS NOTES
Spoke with Saira about next steps in her sister's care and involvement in [facility]. Saira was instructed and urged to file a VA on her sister. CG gave examples of when to file and emailed her the link to start the process. CG asked Saira to call with any questions regarding this but that we will be unenrolling Pat due to not wanting to work on any goals. Saira was understanding. Saira also agreed to file a VA today or tomorrow. The following email was sent to Saira at [email or web address]:    Click on the first link to file. The other links are great for information. I included the definition too as I was having a hard time explaining it to you. Once you click the link, read the instructions and clinic continue at the bottom. This will take you through each section. Make sure when you start you have enough time to finish. It will not let you save and come back later. I would devote 30-60 minutes for this. If you wish to call and speak to someone instead, the phone # is [phone].    A vulnerable adult can be anyone over age 18 who:   o Has a physical, mental or emotional disorder that makes it difficult for the person to care for themselves without help and to protect themselves from maltreatment   o Is in a hospital, nursing home, transitional care unit, assisted living, housing with services, board and care, foster care or other licensed care facility   o Receives services such as home care, day services, personal care assistance or other licensed services.   Maltreatment includes:   o Abuse, including physical, emotional and sexual abuse, use of restraints, involuntary seclusion or punishment   o Neglect, including failure to provide necessary food, shelter, clothing, health care or supervision because of neglect by a caregiver or because the vulnerable adult cannot meet their own needs   o Financial exploitation, including theft or withholding of money or property and/or use of money or property not for the vulnerable  adult's benefit.  https://tnt09.agileapps.dhs.[facility].mn./networking/sites/189322852/MAARC    https://edocs.dhs.[facility].mn.us/lfserver/Public/DHS-5722E-ENG    https://[place].gov/dhs/people-we-serve/adults/services/adult-protection/      Updates on Pat-She was approved for MA. Saira is still working with an  to obtain a professional guardian.    Pt will be unenrolled in one week after her VA has time to be filed and to allow for questions.    No further outreach necessary unless Saira or patient contact [facility] CG within the week.

## 2021-06-19 NOTE — PROGRESS NOTES
Patient has selected to un-enroll or has transferred to a non-[facility] clinic and no further outreach will be done. I have discussed with the patient's PCP and have removed patient from [facility] enrolled patient list.

## 2021-06-19 NOTE — PROGRESS NOTES
[facility] SW and RN consulted regarding CG outreach from 6/28/18. Pt has no active goals to work on, CG wondering about how to move forward with pt as pt has complex psychosocial issues. RN will review pt's chart and discuss further with [facility] team.

## 2021-06-20 NOTE — PROGRESS NOTES
Writer followed up with Belgica Zamora  from [facility] regard to status of petition for guardianship physician completed.  According to Belgica GUARDADO the [facility] supported the petition although a court date has not yet been established.  Belgica GUARDADO is also assisting patient in finding an alternate being arrangement.  Patient is exploring the possibility of moving into the [facility] assisted living although, at this time there is a waiting list.  Concerns expressed by writer for patient is that patient appears to need a structured setting that support her cognitive care needs.    Belgica To [facility]   1340  8/28/18
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
## 2021-06-22 NOTE — PROGRESS NOTES
[facility] DEBBY received call from pt's  Belgica Lindo informed DEBBY that the PCP had a recent visit with the pt, wanting pt to get in-home RN services. Belgica explained that she would need for the PCP to refer the pt to an agency that accepts waiver payment so that the pt can continue to use the agency for RN services long term.     Belgica GONSALEZ Provided 2 agencies for PCP to refer to that accept waiver payment:    [facility]  Phone: [phone]    [facility]  Phone: [phone]    SW will forward this information to the PCP. Pt is no longer working with [facility], DEBBY updated Belgica with this info and informed Belgica she can contact the clinic directly moving forward with any additional needs from PCP.

## 2021-06-23 NOTE — PROGRESS NOTES
1.Please call insurance to verify coverage for Shingrix (Shingles) vaccine and TD vaccine and check local pharmacy

## 2021-06-23 NOTE — TELEPHONE ENCOUNTER
Who is calling:  Blanca- patients CMA  Reason for Call:    1. Looking for ride services to get the patient to and from her appointments. Blanca is not authorized by the state to transport patient in her vehicle. Patient has upcoming appointment next week and will need a ride.    2. Looking for a prescription medication to help with smoking cessation    Pharmacy: [facility]- [address]    Date of last appointment with primary care: 11/15/2018  Has the patient been recently seen:  No  Okay to leave a detailed message: [phone]

## 2021-06-23 NOTE — PATIENT INSTRUCTIONS - HE
1. For nasal congestion use saline nasal spray    2. Flu shot today    3. Start on HCTZ medication to help lower b/p and help with edema    4. We will check for TB test with blood test as well.    5. Will check thyroid levels today

## 2021-06-23 NOTE — TELEPHONE ENCOUNTER
Who is calling:   Julia   Reason for Call:  I am requesting a copy of the patients medication list to be faxed to [phone].  Date of last appointment with primary care: 11/15/18  Has the patient been recently seen:  No  Okay to leave a detailed message: Yes

## 2021-06-23 NOTE — PROGRESS NOTES
[facility] Follow Up Note    Assessment/Plan:    1. Alzheimer's dementia without behavioral disturbance, unspecified timing of dementia onset  Pt is isolated and has increased difficulty in taking care of herself. She plans to relocate to senior living at [place]. She has a guardian assigned by court. She declines preventative care (colonoscopy, mammogram, DeXA scan). Agreed to have flu shot today.    2. H/o Thyroid CA, thyroidectomy and Hypothyroidism  US done last year did not show any recurrence. Thyroglobulin was negative in November. TSH was elevated at 10 and Synthroid dose was increased to 112mcg a day. TSH today is still high. Will increase Synthroid to 125 mcg and repeat TSH in 2 months. Goal TSh closer to 1.  - Thyroid Stimulating Hormone (TSH)    3. Moderate Recurrent Major Depression  She is taking Lexapro, no side effects. Started taking it consistently 3 weeks ago. ontinue current dose.    4. Screening-pulmonary TB  - QTF-Mycobacterium tuberculosis by [facility]-TB Gold Plus    6. Dental infection  She is on amoxicillin and will f/u with dentist in 1 week for extractions.    7. Smoking  She is aware that she can not smoke sigarrets at [place]. Nicotine patches has caused itchiness in the past. Using lozenges.Flu shot administered today. Will need to start pneumonia shots on f/u.    8. Essential hypertension and increased LE edema  H/o AZALEA. Also, salty food from meals on wheels might be contributing. Will start on small dose of HCTZ, leg elevation.  - hydroCHLOROthiazide (HYDRODIURIL) 12.5 MG tablet; Take 1 tablet (12.5 mg total) by mouth daily.  Dispense: 30 tablet; Refill: 11    9. Flu vaccine need  - Influenza High Dose, Seasonal    F/u in 2 months.    Liliya Bryan MD    Chief Complaint:  Chief Complaint   Patient presents with     Follow-up     thyroid     Eye Problem     right eye itchy       History of Present Illness:  Ginger is a 73 y.o. female  with history of high blood  pressure, Alzheimer's dementia, chronic low back pain, osteoarthritis, trigeminal neuralgia (she is on Neurontin), hypothyroidism, IBS, prediabetes, morbid obesity and sleep apnea.  She is currently here for follow-up and she is accompanied by a county worker Blanca who is helping her out.    Pt is estranged from her family. She has moderate dementia and has been isolated leaving by herself and has had difficulty taking care of herself. She is getting meals on wheels and has had medication dispenser installed to help with medication compliance. Previous testing by Dr Landaverde showed MOCA score 18/30. Pt has gotten a guardian assigned to her by the court and currently is looking for senior living places with help of Radha. She hopes to be able to get into [facility] memory unit.    Pt did endorse Sx of depression on previous visit. I ordered Lexapro, but she has only started taking it consistently 3 weeks ago. She denies side effects. I think increased opportunity to socialize at the [facility] will be very helpful for her. She endorses liking to do crafts.     She has h/o Thyroid Cancer, s/p thyroidectomy, on Synthroid replacement. Neck US and Thyroglobulin levels were normal in 2018. TSh was elevated at 10. Per Radha and patient, she has not missed any doses of Synthroid and we did increased her Synthroid to 112 mcgs. Will check levels today. Goal TSH is closer to 1 given h/o CA.     She id on Gabapentin due to Trigeminal neuralgia.    She has had tooth infection and dentist put her on Amoxicillin. She will f/u with him in 1 week for extractions.    Pt is a smokes and know she will not be avle to smoke at [facility]. She is utilizing nicotine lozenges. I did encourage her to get Flu shot today.    Pt continue to decline preventative care: mammogram, breast exam, bone density testing. In the future she may be persuadable to get pneumonia shots.    Review of Systems:  A comprehensive review of systems was  performed and was otherwise negative    PFSH:  Social History: reviewed and as above  Social History     Tobacco Use   Smoking Status Former Smoker     Last attempt to quit: 2012     Years since quittin.5   Smokeless Tobacco Never Used     Social History     Social History Narrative    Patient has Alzheimer's dementia.  She has a sister but has not been on contact with her. She lives by herself She does not drive.  She administers medications to herself . She has a medication dispenser.  She wears glasses.       Past History: reviewed  Current Outpatient Medications   Medication Sig Dispense Refill     ACETAMINOPHEN (TYLENOL ARTHRITIS PAIN ORAL) Take 650 mg by mouth every 6 (six) hours as needed (pain).        aspirin 325 MG EC tablet Take 1 tablet (325 mg total) by mouth daily. 90 tablet 2     cholecalciferol, vitamin D3, (VITAMIN D3) 2,000 unit capsule Take 1 capsule (2,000 Units total) by mouth daily. 90 capsule 2     cyanocobalamin 1000 MCG tablet Take 1 tablet (1,000 mcg total) by mouth daily. 90 tablet 2     escitalopram oxalate (LEXAPRO) 5 MG tablet Take 1 tablet (5 mg total) by mouth daily. 30 tablet 11     gabapentin (NEURONTIN) 600 MG tablet TAKE TWO TABLETS BY MOUTH THREE TIMES DAILY 540 tablet 2     levothyroxine (SYNTHROID, LEVOTHROID) 112 MCG tablet Take 1 tablet (112 mcg total) by mouth daily. 30 tablet 11     lysine 500 mg Tab Take 1 tablet (500 mg total) by mouth daily. 90 each 2     multivitamin therapeutic tablet Take 1 tablet by mouth daily.       nicotine polacrilex (NICORETTE) 2 MG lozenge Apply 1 lozenge (2 mg total) to the mouth or throat as needed for smoking cessation. 96 each 3     omega 3-dha-epa-fish oil 120-180-600 mg cap Take by mouth.       amoxicillin (AMOXIL) 500 MG capsule Take 1 capsule (500 mg total) by mouth 3 (three) times a day for 7 days. 21 capsule 0     hydroCHLOROthiazide (HYDRODIURIL) 12.5 MG tablet Take 1 tablet (12.5 mg total) by mouth daily. 30 tablet 11      No current facility-administered medications for this visit.        Family History: reviewed    Physical Exam:    Vitals:    02/05/19 1546   BP: 148/82   Patient Site: Left Arm   Patient Position: Sitting   Cuff Size: Adult Large   Pulse: (!) 56   SpO2: 98%   Weight: 193 lb 3.2 oz (87.6 kg)     Wt Readings from Last 3 Encounters:   02/05/19 193 lb 3.2 oz (87.6 kg)   11/15/18 185 lb (83.9 kg)   06/11/18 185 lb (83.9 kg)     Body mass index is 37.11 kg/m .    Constitutional:  Reveals a elderly female, awake, alert, oreinted to place and person, occasionally agitated when Radha speaks for her but easily redirected.  Vitals:  Per nursing notes.  HEENT:No cervical LAD, no thyromegaly,  conjunctiva is pink, no scleral icterus, TMs are visualized and normal bl, oropharynx is clear, no exudates,   Cardiac:  Regular rate and rhythm,no murmurs, rubs, or gallops.Legs with 1-2+ edema. Palpation of the radial pulse regular.  Lungs: Clear to auscultation bl.  Respiratory effort normal. Decreased BS bl, no wheezes.  Abdomen:positive BS, soft, nontender, nondistended.  No hepato-splenomagaly  Rheumatologic: Normal joints and nails of the hands.  Neurologic:  Cranial nerves II-XII intact.     Psychiatric: affect slightly flat, memory is decreased.    Data Review:    Analysis and Summary of Old Records (2): yes      Records Requested (1): no      Other History Summarized (from other people in the room) (2): yes, Radha    Radiology Tests Summarized (XRAY/CT/MRI/DXA) (1): Thyroid US    Labs Reviewed (1): yes    Medicine Tests Reviewed (EKG/ECHO/COLONOSCOPY/EGD) (1): no    Independent Review of EKG or X-RAY (2): no

## 2021-06-23 NOTE — TELEPHONE ENCOUNTER
Spoke with pt's guardian, Blanca, consent to communicate in chart.  Relayed PCP message and message from [facility].  Blanca understanding, and states pt is not currently smoking, but uses the nicorette lozenges and would like a prescription for these.

## 2021-06-23 NOTE — TELEPHONE ENCOUNTER
Please let Blanca know,    Please let pt know on the ride comment from [facility].    For smoking cessation we can try Nicotine patches, but not sure if pt can use them on her own due to dementia.  I will need to know how much she smokes.    Who is getting her sigarrets any way? Does she shop on her own?

## 2021-06-23 NOTE — TELEPHONE ENCOUNTER
Please let Blanca Keithr (her  [phone]): and patient know (they should both be on the phone):    Tb testing is negative ( see if her results and my H and P needs to be faxed).    Thyroid levels are still not in range. I'm increasing her Thyroid medication further from 112 to 125 mgc a day. New script sent to the pharmacy.    Repeat Thyroid levels in 2 months.

## 2021-06-23 NOTE — TELEPHONE ENCOUNTER
Patient Returning Call  Reason for call:  Results  Information relayed to patient:  Please let Blanca Romero (her  [phone]): and patient know (they should both be on the phone):     Tb testing is negative ( see if her results and my H and P needs to be faxed).     Thyroid levels are still not in range. I'm increasing her Thyroid medication further from 112 to 125 mgc a day. New script sent to the pharmacy.     Repeat Thyroid levels in 2 months.  Patient has additional questions:  Yes  If YES, what are your questions/concerns:  Blanca is requesting that these results and the most recent H&P be faxed to [facility] [phone].  Okay to leave a detailed message?: No call back needed

## 2021-06-24 NOTE — TELEPHONE ENCOUNTER
Patient Returning Call  Reason for call:  Holly returning the call to check on the status of this request  Information relayed to patient:    caller was informed as listed below. Caller is requesting the signed and dated medication list to be faxed to the facility at [phone] also please send fax ASAP  Patient has additional questions:  No  If YES, what are your questions/concerns:  none  Okay to leave a detailed message?: Yes

## 2021-06-24 NOTE — TELEPHONE ENCOUNTER
FYI - Status Update  Who is Calling: Holly with [facility]  Update: Calling to inform provider [facility] will not except the after visit summary. Pharmacy is requesting a signed medication list be faxed to [phone]. Holly is asking that this request be expedited. Patient was admitted to their facility today, and has no medication. Please advise.  Okay to leave a detailed message?:  Yes

## 2021-06-24 NOTE — TELEPHONE ENCOUNTER
Yes,please let them know, she should take Synthroid 125 mcg a day and have repeat TSH done at the end of March.

## 2021-06-24 NOTE — TELEPHONE ENCOUNTER
Medication list signed by Dr GRADY and faxed to [facility] number below.

## 2021-06-24 NOTE — TELEPHONE ENCOUNTER
Orders being requested: Order to pass medication as written .     levothyroxine (SYNTHROID, LEVOTHROID) 125 MCG tablet    Reason service is needed/diagnosis: Patient is in a new Assisted living Facility     When are orders needed by: ASAP     Where to send Orders: Fax   ATT: Holly Membreno   [phone]    Okay to leave detailed message?  Yes    [phone]

## 2021-06-26 NOTE — PROGRESS NOTES
Progress Notes by Rosa Alamo at 2/14/2018  2:43 PM     Author: Rosa Alamo Service: -- Author Type: Community Health Worker    Filed: 3/1/2018  1:46 PM Encounter Date: 2/14/2018 Status: Signed    : Rosa Alamo (Community Health Worker)       03/01/18 Patient's sister left  yesterday after FRG gone for the day asking for [facility] to contact her but today dropped off application to Care Guide who reports she will mail to [facility], nothing else needed from [facility] at this time. Will check back with patient or sister on 03/26 to see if anything else needed from [facility].   Closing encounter at this time please refer the patient to [facility] if patient has a new need prior to medical renewal.   Rosa Malick Singh, Phone: [phone], Fax: [phone]    02/21/18 Assisted patient's sister with completing LTC application via phone appointment. Sending her application and PORFIRIO to take to patient to have her sign and return.     02/20/18 Called patient's sister, Saira and set up a time to complete Application for [employer] with her over the phone, I will then mail the application to her and she will have her sister completed necessary information. Checking with Care Guide and  to make sure this is correct application patient needs to complete.     02/12/18 Referral Patient had previously requested assistance with Energy application but then sister said she would contact when patient was ready to meet, never received any contact. New referral for MA/Medicare Savings.    FR referral 2/12/18  Received: 2 days ago       Gayle Alamo                     Per  notes. Please contact sister Saira.            Previous Messages       ----- Message -----      From: DEBBY Lozano      Sent: 2/12/2018   1:09 PM        To: VICTOR MANULE Keen, Gayle Arrington   Subject: FW: Please note pt. has HC directive.               [facility], our Financial Resource Guide can help  Saira with completing the MA application you had emailed her.     Gayle, could you reach out to Saira and offer FRG assist with the application and a visit with me if she is needing further support understanding the services that [facility] had recommended?     Once we have that MA application turned in, I could do a referral for EW to the [facility] to have Pat assessed. I will want to keep Saira in the loop on that as it would be best to have her be the primary contact for the [facility] to schedule the assessment.     Thank you for the update [facility]!     ----- Message -----      From: Marito To, LICSW      Sent: 2/12/2018  12:15 PM        To: DEBBY Lozano   Subject: Please note pt. has HC directive.                 Spoke with pt's sister Saira (who is the HC agent. She assumed that both clinics were looking for placement for pt. I emailed Saira the link for MA application.  Pt. Will also need a EW waiver completed. Gave Saira contact info for [employer] (a moving [employer] and [employer] [facility]. Also reccomended Saira obtain Durable POA for finances. Although we aren't able to find placement we can assist with the process.  Please call me if you have further questions.     Thank-you     [facility]   [phone]

## 2021-07-03 NOTE — ADDENDUM NOTE
Addendum Note by Prashant Burns LPN at 1/29/2019  2:22 PM     Author: Prashant Burns LPN Service: -- Author Type: Licensed Nurse    Filed: 1/29/2019  2:22 PM Encounter Date: 1/28/2019 Status: Signed    : Prashant Burns LPN (Licensed Nurse)    Addended by: PRASHANT BURNS on: 1/29/2019 02:22 PM        Modules accepted: Orders

## 2021-07-03 NOTE — ADDENDUM NOTE
Addendum Note by Cele White CMA at 10/25/2017  2:25 PM     Author: Cele White CMA Service: -- Author Type: Certified Medical Assistant    Filed: 10/25/2017  2:25 PM Date of Service: 10/25/2017  2:25 PM Status: Signed    : Cele White CMA (Certified Medical Assistant)    Encounter addended by: Cele White CMA on: 10/25/2017  2:25 PM<BR>     Actions taken: Order Reconciliation Section accessed

## 2021-07-03 NOTE — ADDENDUM NOTE
Addendum Note by Eva Adkins RN at 4/5/2017 11:59 PM     Author: Eva Adkins RN Service: -- Author Type: Registered Nurse    Filed: 4/12/2017 10:56 AM Date of Service: 4/5/2017 11:59 PM Status: Signed    : Eva Adkins RN (Registered Nurse)    Encounter addended by: Eva Adkins RN on: 4/12/2017 10:56 AM<BR>     Actions taken: Charge Capture section accepted

## 2021-07-03 NOTE — ADDENDUM NOTE
Addendum Note by Eva Adkins RN at 3/28/2018 11:59 PM     Author: Eva Adkins RN Service: -- Author Type: Registered Nurse    Filed: 4/4/2018  2:07 PM Date of Service: 3/28/2018 11:59 PM Status: Signed    : Eva Adkins RN (Registered Nurse)    Encounter addended by: Eva Adkins RN on: 4/4/2018  2:07 PM<BR>     Actions taken: Visit diagnoses modified, Charge Capture section accepted

## 2021-07-03 NOTE — ADDENDUM NOTE
Addendum Note by Tiffany Grewal CMA at 6/11/2018 11:59 PM     Author: Tiffany Grewal CMA Service: -- Author Type: Certified Medical Assistant    Filed: 6/12/2018 12:04 PM Date of Service: 6/11/2018 11:59 PM Status: Signed    : Tiffany Grewal CMA (Certified Medical Assistant)    Encounter addended by: Tiffany Grewal CMA on: 6/12/2018 12:04 PM<BR>     Actions taken: Charge Capture section accepted

## 2021-07-03 NOTE — ADDENDUM NOTE
Addendum Note by Ho Bryan MD at 1/29/2019  4:37 PM     Author: Ho Bryan MD Service: -- Author Type: Physician    Filed: 1/29/2019  4:37 PM Encounter Date: 1/28/2019 Status: Signed    : Ho Bryan MD (Physician)    Addended by: HO BRYAN on: 1/29/2019 04:37 PM        Modules accepted: Orders

## 2021-07-03 NOTE — ADDENDUM NOTE
Addendum Note by Eva Adkins RN at 12/28/2017 11:59 PM     Author: Eva Adkins RN Service: -- Author Type: Registered Nurse    Filed: 1/10/2018  8:50 AM Date of Service: 12/28/2017 11:59 PM Status: Signed    : Eva Adkins RN (Registered Nurse)    Encounter addended by: Eva Adkins RN on: 1/10/2018  8:50 AM<BR>     Actions taken: Visit diagnoses modified, Charge Capture section accepted

## 2021-07-03 NOTE — ADDENDUM NOTE
Addendum Note by Cele White CMA at 10/25/2017  2:16 PM     Author: Cele White CMA Service: -- Author Type: Certified Medical Assistant    Filed: 10/25/2017  2:16 PM Date of Service: 10/25/2017  2:16 PM Status: Signed    : Cele White CMA (Certified Medical Assistant)    Encounter addended by: Cele White CMA on: 10/25/2017  2:16 PM<BR>     Actions taken: Order Reconciliation Section accessed

## 2021-07-03 NOTE — ADDENDUM NOTE
Addendum Note by Ho Bryan MD at 11/27/2018  5:03 PM     Author: Ho Bryan MD Service: -- Author Type: Physician    Filed: 11/27/2018  5:03 PM Encounter Date: 11/25/2018 Status: Signed    : Ho Bryan MD (Physician)    Addended by: HO BRYAN on: 11/27/2018 05:03 PM        Modules accepted: Orders

## 2021-07-03 NOTE — ADDENDUM NOTE
Addendum Note by Tiffany Grewal CMA at 10/25/2017  3:39 PM     Author: Tiffany Grewal CMA Service: -- Author Type: Certified Medical Assistant    Filed: 10/25/2017  3:39 PM Date of Service: 10/25/2017  3:39 PM Status: Signed    : Tiffany Grewal CMA (Certified Medical Assistant)    Encounter addended by: Tiffany Grewal CMA on: 10/25/2017  3:39 PM<BR>     Actions taken: Charge Capture section accepted

## 2021-07-03 NOTE — ADDENDUM NOTE
Addendum Note by Ho Bryan MD at 12/3/2018  8:33 PM     Author: Ho Bryan MD Service: -- Author Type: Physician    Filed: 12/3/2018  8:33 PM Encounter Date: 12/3/2018 Status: Signed    : Ho Bryan MD (Physician)    Addended by: HO BRYAN on: 12/3/2018 08:33 PM        Modules accepted: Orders

## 2021-07-14 PROBLEM — F44.89 CONFUSION STATE: Status: RESOLVED | Noted: 2017-12-11 | Resolved: 2018-03-23

## 2023-03-02 NOTE — PROGRESS NOTES
.OUT PATIENT-CLINICAL SOCIAL WORK PROGRESS NOTE      10/25/2017           Ginger Lewis is a 72 y.o. female presenting to the clinic with symptoms of dementia and depression.  The patient was accompanied to her appointment with her sister Saira.       ASSESSMENT: Social work intern assisted patient and her sister in filling out a long form health care directive.        PLAN: The patient and her sister received copies of her directive.  The patient stated in the future she might want to make changes adding in more of her wishes.   will follow up at next appointment if requested or sooner.      Type of Service: Office Visit    Services Provided: Assistance completing a health care directive.    Resources Provided: Honoring Ramos Amaya, Social Work Intern  10/25/17    Reviewed by [facility] To [facility]  10/25/17           Tissue Cultured Epidermal Autograft Text: Because of the size and depth of the defect and to facilitate healing by granulation, a tissue-cultured epidermal autograft was planned.  After prep and local anesthesia, Xenograft material was trimmed to fi the defect and secured circumferentially.